# Patient Record
Sex: FEMALE | Race: WHITE | NOT HISPANIC OR LATINO | Employment: OTHER | URBAN - METROPOLITAN AREA
[De-identification: names, ages, dates, MRNs, and addresses within clinical notes are randomized per-mention and may not be internally consistent; named-entity substitution may affect disease eponyms.]

---

## 2017-03-02 ENCOUNTER — ALLSCRIPTS OFFICE VISIT (OUTPATIENT)
Dept: OTHER | Facility: OTHER | Age: 57
End: 2017-03-02

## 2017-03-03 ENCOUNTER — GENERIC CONVERSION - ENCOUNTER (OUTPATIENT)
Dept: OTHER | Facility: OTHER | Age: 57
End: 2017-03-03

## 2017-03-06 ENCOUNTER — LAB CONVERSION - ENCOUNTER (OUTPATIENT)
Dept: OTHER | Facility: OTHER | Age: 57
End: 2017-03-06

## 2017-03-06 LAB
CONTAINER TYP (HISTORICAL): NORMAL
FINAL RESOLUTION (HISTORICAL): NORMAL
QUESTION/PROBLEM (HISTORICAL): NORMAL
SPECIMEN STATUS REPORT (HISTORICAL): NORMAL

## 2017-03-09 ENCOUNTER — LAB CONVERSION - ENCOUNTER (OUTPATIENT)
Dept: OTHER | Facility: OTHER | Age: 57
End: 2017-03-09

## 2017-03-09 LAB
ADDITIONAL INFORMATION (HISTORICAL): NORMAL
COMMENT (HISTORICAL): NORMAL
CONTAINER TYP (HISTORICAL): NORMAL
DIAGNOSIS (HISTORICAL): NORMAL
FINAL RESOLUTION (HISTORICAL): NORMAL
MICRO DESCRIPTION (HISTORICAL): NORMAL
PATHOLOGIST (HISTORICAL): NORMAL
PROCEDURE (HISTORICAL): NORMAL
QUESTION/PROBLEM (HISTORICAL): NORMAL
SITE/SOURCE (HISTORICAL): NORMAL
SPECIMEN STATUS REPORT (HISTORICAL): NORMAL

## 2018-01-12 NOTE — PROGRESS NOTES
Assessment    1  Never a smoker   2  Hemorrhoids (455 6) (K64 9)   3  Encounter for preventive health examination (V70 0) (Z00 00)    Plan  Colonoscopy (Fiberoptic) Screening, Hemorrhoids    · 2 - *AMITA ( COLORECTAL SURGERY, GASTROENTEROLOGY) Physician  Referral  Consult  Status: Hold For - Scheduling   Requested for: [id number]  Care Summary provided  : Yes  Encounter for screening mammogram for malignant neoplasm of breast    · * MAMMO SCREENING BILATERAL W CAD; Status:Hold For - Scheduling; Requested  for:72Hsw2164;    · Digital Bilateral Screening Mammogram With CAD; Status:Canceled - Scheduling; Fatigue    · (Q) CBC (INCLUDES DIFF/PLT) (REFL); Status:Active; Requested for:61Pcy2145;    · (Q) COMPREHENSIVE METABOLIC PANEL W/O eGFR; Status:Active; Requested  for:09Vct8273;    · (Q) TSH, 3RD GENERATION; Status:Active; Requested for:94Nkp6433;   Fatigue, Vitamin D deficiency    · (1) VITAMIN D 25-HYDROXY; Status:Active; Requested for:95Dmv9156;   Screening for malignant neoplasm of colon    · COLONOSCOPY; Status:Active; Requested for:23Rko7985; Taking medication for chronic disease    · (1) URINALYSIS w URINE C/S REFLEX (will reflex a microscopy if leukocytes, occult  blood, or nitrites are not within normal limits); Status:Active; Requested for:68Rts1681;    · (Q) LIPID PANEL WITH REFLEX TO DIRECT LDL; Status:Active; Requested  for:13Wgg0511;     Discussion/Summary  health maintenance visit healthy adult female Currently, she eats a healthy diet and has an adequate exercise regimen  the risks and benefits of cervical cancer screening were discussed cervical cancer screening is current Breast cancer screening: the risks and benefits of breast cancer screening were discussed, self breast exam technique was taught, monthly self breast exam was advised and mammogram is current   Colorectal cancer screening: the risks and benefits of colorectal cancer screening were discussed and colonoscopy has been ordered  The risks and benefits of immunizations were discussed and immunizations are up to date  Advice and education were given regarding nutrition, aerobic exercise, weight bearing exercise, weight loss, self skin examination and seat belt use  Patient discussion: discussed with the patient  Physical - conducted, immunizations up to date, lab slip given patient stated "labs are not covered in my insurance and I plan to pay for a full screen even if it is $800"    mammo - due now  pap - under care Dr Lynda Carranza due 10/2016  colon - refer to Charleen    f/u 1 year or sooner if needed  Chief Complaint  Pt presents to the office for her general physical  colon due 10/2015, needs referral  mammo 07/08/2016, order printed  PAP deferred      History of Present Illness  HM, Adult Female: The patient is being seen for a health maintenance evaluation  General Health: The patient's health since the last visit is described as good  Lifestyle:  She consumes a diverse and healthy diet  She exercises regularly  She exercises 5 times per week  Exercise includes walking, biking and kayak  She does not use tobacco  She consumes alcohol  She denies drug use  Reproductive health: the patient is postmenopausal   hysterectomy 2004, oompherectomy 2005 follows with Dr Lynda Carrnaza for estrace cream, last appt Oct 2015  Screening:   HPI: Patient here for a physical  No complaints  Went to a dermatologist rosacea Dr Laron Carrel then refer to Dr Emilia Garrido  Also seeing Dr Laney Saravia for dry eyes and had plugs put in eyes for dry eyes  Review of Systems    Constitutional: No fever, no chills, feels well, no tiredness, no recent weight gain or weight loss  Eyes: No complaints of eye pain, no red eyes, no eyesight problems, no discharge, no dry eyes, no itching of eyes  ENT: no complaints of earache, no loss of hearing, no nose bleeds, no nasal discharge, no sore throat, no hoarseness     Cardiovascular: No complaints of slow heart rate, no fast heart rate, no chest pain, no palpitations, no leg claudication, no lower extremity edema  Respiratory: No complaints of shortness of breath, no wheezing, no cough, no SOB on exertion, no orthopnea, no PND  Gastrointestinal: No complaints of abdominal pain, no constipation, no nausea or vomiting, no diarrhea, no bloody stools  Genitourinary: No complaints of dysuria, no incontinence, no pelvic pain, no dysmenorrhea, no vaginal discharge or bleeding  Musculoskeletal: No complaints of arthralgias, no myalgias, no joint swelling or stiffness, no limb pain or swelling  Integumentary: No complaints of skin rash or lesions, no itching, no skin wounds, no breast pain or lump  Neurological: No complaints of headache, no confusion, no convulsions, no numbness, no dizziness or fainting, no tingling, no limb weakness, no difficulty walking  Psychiatric: Not suicidal, no sleep disturbance, no anxiety or depression, no change in personality, no emotional problems  Endocrine: No complaints of proptosis, no hot flashes, no muscle weakness, no deepening of the voice, no feelings of weakness  Hematologic/Lymphatic: No complaints of swollen glands, no swollen glands in the neck, does not bleed easily, does not bruise easily  Active Problems    1  Arthritis (716 90) (M19 90)   2  Bacterial vaginosis (616 10,041 9) (N76 0,B96 89)   3  Colonoscopy (Fiberoptic) Screening   4  Cyst of left kidney (753 10) (N28 1)   5  Dry eye syndrome (375 15) (H04 129)   6  Encounter for gynecological examination without abnormal finding (V72 31) (Z01 419)   7  Encounter for screening mammogram for malignant neoplasm of breast (V76 12)   (Z12 31)   8  Laboratory examination ordered as part of a routine general medical examination   (V72 62) (Z00 00)   9  Postmenopause atrophic vaginitis (627 3) (N95 2)   10  Shoulder joint pain, unspecified laterality   11   Squamous Cell Carcinoma Of The Skin (173 92)    Past Medical History    · History of Frozen shoulder (726 0) (M75 00)   · History of endometriosis (V13 29) (Z87 42)   · History of pityriasis rosea (V13 3) (Z87 2)   · History of Joint pain, knee (719 46) (M25 569)   · History of Pes anserine bursitis (726 61) (M70 50)   · History of Tick bite of right thigh (916 4,E906 4) (U11 541Z,E31  Lula Ballard)    Surgical History    · History of Arthroscopy Knee Right   · History of Bladder Surgery   · History of  Section   · History of Chemosurgery (Mohs Micrographic Technique)   · History of Hysterectomy   · History of Oophorectomy    Family History  Mother    · Family history of cardiac disorder (V17 49) (Z82 49)   · Family history of Parkinson disease, symptomatic  Father    · Family history of cardiac disorder (V17 49) (Z80 55)  Sister    · Family history of cerebral aneurysm (V17 1) (Z82 49)  Brother    · Family history of Arthritis (V17 7)   · Family history of Heart Disease (V17 49)   · Family history of Hypertension (V17 49)   · Family history of Ischemic Stroke (V17 1)   · Family history of Kidney Cancer (V16 51)   · Family history of Osteoporosis (V17 81)  Grandfather    · Family history of cardiac disorder (V17 49) (Z82 49)    Social History    · Always uses seat belt   · Being A Social Drinker   · Caffeine Use   · Denied: History of Drug Use   · Has smoke detectors   · Marital History - Currently    · Never a smoker   · Uses Safety Equipment   · Uses Safety Equipment - Seatbelts    Current Meds   1  Betamethasone Sod Phos & Acet 6 (3-3) MG/ML Injection Suspension; USE AS   DIRECTED; To Be Done: 49ZAN5253; Status: HOLD FOR - Administration Ordered   2  Betamethasone Sod Phos & Acet 6 (3-3) MG/ML Injection Suspension; USE AS   DIRECTED; To Be Done: 29GTU7790; Status: HOLD FOR - Administration Ordered   3  Estrace 0 1 MG/GM Vaginal Cream; USE TWICE WEEKLY AS DIRECTED  Requested   for: 88OJR1382; Last Rx:2015 Ordered   4  H[place] TABS;    Therapy: (Recorded:12May2015) to Recorded   5  Krill Oil CAPS; take 1 capsule daily; Therapy: (Recorded:28Mar2014) to Recorded   6  Lidocaine HCl - 1 % Injection Solution; USE AS DIRECTED; To Be Done: 58CMT9240;   Status: HOLD FOR - Administration Ordered   7  Restasis 0 05 % Ophthalmic Emulsion; USE AS DIRECTED; Therapy: (Recorded:12May2015) to Recorded   8  Solodyn 80 MG Oral Tablet Extended Release 24 Hour; Take 1 tablet daily; Therapy: (Recorded:26Jul2016) to Recorded   9  Vitamin B Complex CAPS; TAKE 1 CAPSULE DAILY; Therapy: (Recorded:12May2015) to Recorded    Allergies    1  No Known Drug Allergies    2  Seasonal    Vitals   Recorded: 76FKS8189 95:49MB   Systolic 950, RUE, Sitting   Diastolic 64, RUE, Sitting   Heart Rate 68, R Radial   Pulse Quality Normal, R Radial   Respiration Quality Normal   Respiration 16   Height 5 ft 5 75 in   Weight 186 lb 6 4 oz   BMI Calculated 30 32   BSA Calculated 1 94     Physical Exam    Constitutional   General appearance: No acute distress, well appearing and well nourished  Head and Face   Head and face: Normal     Eyes   Conjunctiva and lids: No swelling, erythema or discharge  Pupils and irises: Equal, round, reactive to light  Ears, Nose, Mouth, and Throat   External inspection of ears and nose: Normal     Otoscopic examination: Tympanic membranes translucent with normal light reflex  Canals patent without erythema  Hearing: Normal     Nasal mucosa, septum, and turbinates: Normal without edema or erythema  Lips, teeth, and gums: Normal, good dentition  Oropharynx: Normal with no erythema, edema, exudate or lesions  Neck   Neck: Supple, symmetric, trachea midline, no masses  Thyroid: Normal, no thyromegaly  Pulmonary   Respiratory effort: No increased work of breathing or signs of respiratory distress  Palpation of chest: Normal     Auscultation of lungs: Clear to auscultation  Cardiovascular   Palpation of heart: Normal PMI, no thrills  Auscultation of heart: Normal rate and rhythm, normal S1 and S2, no murmurs  Carotid pulses: 2+ bilaterally  Pedal pulses: 2+ bilaterally  Examination of extremities for edema and/or varicosities: Normal     Abdomen   Abdomen: Non-tender, no masses  Liver and spleen: No hepatomegaly or splenomegaly  Lymphatic   Palpation of lymph nodes in neck: No lymphadenopathy  Musculoskeletal   Gait and station: Normal     Digits and nails: Normal without clubbing or cyanosis  Joints, bones, and muscles: Normal     Range of motion: Normal     Stability: Normal     Muscle strength/tone: Normal     Skin   Skin and subcutaneous tissue: Normal without rashes or lesions  Palpation of skin and subcutaneous tissue: Normal turgor  Neurologic   Cranial nerves: Cranial nerves II-XII intact  Reflexes: 2+ and symmetric  Psychiatric   Judgment and insight: Normal     Mood and affect: Normal        Results/Data  PHQ-2 Adult Depression Screening 59Vpz4757 02:31PM User, [facility]     Test Name Result Flag Reference   PHQ-2 Adult Depression Score 0     Over the last two weeks, how often have you been bothered by any of the following problems? Little interest or pleasure in doing things: Not at all - 0  Feeling down, depressed, or hopeless: Not at all - 0   PHQ-2 Adult Depression Screening Negative         Health Management  Colonoscopy (Fiberoptic) Screening   COLONOSCOPY; every 10 years; Last 12Oct2005; Next Due: 35NNW7986; Overdue  Encounter for gynecological examination without abnormal finding   (every) THINPREP TIS PAP RFX HPV; every 1 year; Last 10Apr2013; Next Due:  66Xkh7941; Overdue  Encounter for screening mammogram for malignant neoplasm of breast   Digital Bilateral Screening Mammogram With CAD; every 1 year; Last 09WNU0804; Next  Due: 79Qxb4707;  Overdue    Signatures   Electronically signed by : Alfornia Dancer, [facility]; Jul 26 2016  3:37PM EST                       (Author)    Electronically signed by : PORTILLO Maya ; Jul 26 2016  5:35PM EST                       (Author)

## 2018-01-14 VITALS
HEIGHT: 66 IN | SYSTOLIC BLOOD PRESSURE: 118 MMHG | DIASTOLIC BLOOD PRESSURE: 72 MMHG | BODY MASS INDEX: 30.41 KG/M2 | WEIGHT: 189.19 LBS

## 2018-02-07 ENCOUNTER — OFFICE VISIT (OUTPATIENT)
Dept: FAMILY MEDICINE CLINIC | Facility: CLINIC | Age: 58
End: 2018-02-07
Payer: COMMERCIAL

## 2018-02-07 VITALS
SYSTOLIC BLOOD PRESSURE: 126 MMHG | BODY MASS INDEX: 30.7 KG/M2 | RESPIRATION RATE: 14 BRPM | WEIGHT: 191 LBS | DIASTOLIC BLOOD PRESSURE: 74 MMHG | HEART RATE: 64 BPM | HEIGHT: 66 IN

## 2018-02-07 DIAGNOSIS — Z00.00 HEALTH MAINTENANCE EXAMINATION: Primary | ICD-10-CM

## 2018-02-07 DIAGNOSIS — C44.92 SQUAMOUS CELL CARCINOMA OF SKIN: ICD-10-CM

## 2018-02-07 DIAGNOSIS — N28.1 BENIGN CYST OF LEFT KIDNEY: ICD-10-CM

## 2018-02-07 DIAGNOSIS — N28.1 CYST OF LEFT KIDNEY: ICD-10-CM

## 2018-02-07 DIAGNOSIS — Z12.39 ENCOUNTER FOR SCREENING FOR MALIGNANT NEOPLASM OF BREAST: ICD-10-CM

## 2018-02-07 DIAGNOSIS — Z13.1 SCREENING FOR DIABETES MELLITUS: ICD-10-CM

## 2018-02-07 DIAGNOSIS — R53.83 OTHER FATIGUE: ICD-10-CM

## 2018-02-07 DIAGNOSIS — N95.2 POSTMENOPAUSE ATROPHIC VAGINITIS: ICD-10-CM

## 2018-02-07 DIAGNOSIS — Z13.6 SCREENING FOR CARDIOVASCULAR CONDITION: ICD-10-CM

## 2018-02-07 DIAGNOSIS — E55.9 VITAMIN D DEFICIENCY: ICD-10-CM

## 2018-02-07 PROCEDURE — 99396 PREV VISIT EST AGE 40-64: CPT | Performed by: PHYSICIAN ASSISTANT

## 2018-02-07 RX ORDER — HYDROCORTISONE ACETATE 0.5 %
CREAM (GRAM) TOPICAL
COMMUNITY
End: 2020-06-30 | Stop reason: ALTCHOICE

## 2018-02-07 RX ORDER — ESTRADIOL 0.1 MG/G
CREAM VAGINAL
COMMUNITY
End: 2018-07-24 | Stop reason: SDUPTHER

## 2018-02-07 RX ORDER — BIOTIN 5 MG
1 TABLET ORAL DAILY
COMMUNITY
End: 2020-06-30 | Stop reason: ALTCHOICE

## 2018-02-07 RX ORDER — B-COMPLEX WITH VITAMIN C
1 TABLET ORAL DAILY
COMMUNITY
End: 2020-06-30 | Stop reason: ALTCHOICE

## 2018-02-07 NOTE — PROGRESS NOTES
Assessment/Plan:    1  Health maintenance examination    - labs ordered  -healthy lifestyle with diet and exercise encouraged  - Tdap up to date    2  Benign cyst of left kidney    - US retroperitoneal complete    3  Vitamin D deficiency    - Vitamin D 25 hydroxy ordered      4  Postmenopause atrophic vaginitis    - c/w estrace cream as needed, Dr Carmine Gonzalez yearly    5  Squamous cell carcinoma of skin    - c/w [facility] derm yearly    Mammo - ordered today  Colon done 3/2017 must obtain records, due 2022  PAP - defer due to hysterectomy, follows for yearly exams    F/u 1 year or sooner if needed            Subjective:   Chief Complaint   Patient presents with    Annual Exam      Patient ID: Leesa Garcia is a 59 y o  female  Patient here for routine follow up  Had colonoscopy in 3/2017, found polyps due for repeat in 5 years  Had vuvla biopsy in 3/2017 was a hematoma  Benign  No longer has paps, just yearly exams with Dr Carmine Gonzalez and refills of Estrace  Also requesting u/s left kidney to follow up on her 5 3 cm cyst also due to her strong family history of kidney cancer  Feeling well has no complaints at this time           The following portions of the patient's history were reviewed and updated as appropriate: allergies, current medications, past family history, past medical history, past social history, past surgical history and problem list     Past Medical History:   Diagnosis Date    Endometriosis     Frozen shoulder     Pes anserine bursitis     Pityriasis rosea      Past Surgical History:   Procedure Laterality Date    BLADDER SURGERY      endometrial madd removed from bladder during [address] ARTHROSCOPY      medical meniscus tear and arthritis scraping     MOHS SURGERY      chemosurgery    OOPHORECTOMY Bilateral      Family History   Problem Relation Age of Onset    Heart defect Mother     Parkinsonism Mother     Heart disease Mother cardiac disorder    Heart defect Father     Heart disease Father      cardiac disorder    Aneurysm Sister     Cerebral aneurysm Sister     Arthritis Brother     Heart defect Brother     Hypertension Brother     Stroke Brother      ischemic    Kidney cancer Brother     Osteoporosis Brother     Heart disease Brother     No Known Problems Maternal Grandmother     No Known Problems Maternal Grandfather     No Known Problems Paternal Grandmother     No Known Problems Paternal Grandfather     Heart disease Other      cardiac disorder     Social History     Social History    Marital status: /Civil Union     Spouse name: N/A    Number of children: N/A    Years of education: N/A     Occupational History    Not on file  Social History Main Topics    Smoking status: Never Smoker    Smokeless tobacco: Never Used    Alcohol use Yes      Comment: social    Drug use: No    Sexual activity: Not on file     Other Topics Concern    Not on file     Social History Narrative    Always uses a seatbelt    Caffeine use    Has smoke detectors        Review of Systems   Constitutional: Negative  HENT: Negative  Eyes: Negative  Respiratory: Negative  Cardiovascular: Negative  Gastrointestinal: Negative  Genitourinary: Negative  Musculoskeletal: Negative  Skin: Negative  Neurological: Negative  Hematological: Negative  Psychiatric/Behavioral: Negative  Objective:    Vitals:    02/07/18 0957   BP: 126/74   BP Location: Left arm   Patient Position: Sitting   Cuff Size: Adult   Pulse: 64   Resp: 14   Weight: 86 6 kg (191 lb)   Height: 5' 5 75" (1 67 m)        Physical Exam   Constitutional: She is oriented to person, place, and time  She appears well-developed and well-nourished  HENT:   Head: Normocephalic and atraumatic     Right Ear: External ear normal    Left Ear: External ear normal    Nose: Nose normal    Mouth/Throat: Oropharynx is clear and moist    Eyes: Conjunctivae and EOM are normal  Pupils are equal, round, and reactive to light  Neck: Normal range of motion  Neck supple  No thyromegaly present  Cardiovascular: Normal rate, regular rhythm, normal heart sounds and intact distal pulses  Pulmonary/Chest: Effort normal and breath sounds normal  She has no wheezes  She has no rales  Abdominal: Soft  Bowel sounds are normal  She exhibits no distension and no mass  There is no tenderness  Musculoskeletal: Normal range of motion  She exhibits no edema  Lymphadenopathy:     She has no cervical adenopathy  Neurological: She is alert and oriented to person, place, and time  She has normal reflexes  Skin: Skin is warm and dry  Psychiatric: She has a normal mood and affect  Vitals reviewed

## 2018-02-07 NOTE — PROGRESS NOTES
I supervised the Advanced Practitioner  I discussed the case with Maximino De Anda PA-C, and reviewed the AP note  I agree with the AP note

## 2018-02-10 LAB
25(OH)D3 SERPL-MCNC: 37 NG/ML (ref 30–100)
ALBUMIN SERPL-MCNC: 4.5 G/DL (ref 3.6–5.1)
ALBUMIN/GLOB SERPL: 1.7 (CALC) (ref 1–2.5)
ALP SERPL-CCNC: 46 U/L (ref 33–130)
ALT SERPL-CCNC: 20 U/L (ref 6–29)
AST SERPL-CCNC: 25 U/L (ref 10–35)
BILIRUB SERPL-MCNC: 0.7 MG/DL (ref 0.2–1.2)
BUN SERPL-MCNC: 17 MG/DL (ref 7–25)
BUN/CREAT SERPL: NORMAL (CALC) (ref 6–22)
CALCIUM SERPL-MCNC: 9.5 MG/DL (ref 8.6–10.4)
CHLORIDE SERPL-SCNC: 105 MMOL/L (ref 98–110)
CHOLEST SERPL-MCNC: 229 MG/DL
CHOLEST/HDLC SERPL: 3.4 (CALC)
CO2 SERPL-SCNC: 28 MMOL/L (ref 20–31)
CREAT SERPL-MCNC: 0.89 MG/DL (ref 0.5–1.05)
GLOBULIN SER CALC-MCNC: 2.6 G/DL (CALC) (ref 1.9–3.7)
GLUCOSE SERPL-MCNC: 84 MG/DL (ref 65–99)
HDLC SERPL-MCNC: 68 MG/DL
LDLC SERPL CALC-MCNC: 141 MG/DL (CALC)
NONHDLC SERPL-MCNC: 161 MG/DL (CALC)
POTASSIUM SERPL-SCNC: 4.2 MMOL/L (ref 3.5–5.3)
PROT SERPL-MCNC: 7.1 G/DL (ref 6.1–8.1)
SL AMB EGFR AFRICAN AMERICAN: 82 ML/MIN/1.73M2
SL AMB EGFR NON AFRICAN AMERICAN: 71 ML/MIN/1.73M2
SODIUM SERPL-SCNC: 142 MMOL/L (ref 135–146)
TRIGL SERPL-MCNC: 90 MG/DL
TSH SERPL-ACNC: 3.01 MIU/L (ref 0.4–4.5)

## 2018-02-13 ENCOUNTER — TELEPHONE (OUTPATIENT)
Dept: FAMILY MEDICINE CLINIC | Facility: CLINIC | Age: 58
End: 2018-02-13

## 2018-02-13 NOTE — TELEPHONE ENCOUNTER
She should be able to see them in my chart now  I have to review them before they will populate  They all looked good/normal EXCEPT her bad cholesterol went from 107 in 2016 to 141 this year, goal is less than 130  She should watch her diet such as eggs, fried foods, red meat  We can recheck it next year

## 2018-02-13 NOTE — TELEPHONE ENCOUNTER
Patient had labs done last week and is looking for her result   She tried going on My Chart, however did not have success      [phone]

## 2018-02-21 ENCOUNTER — TELEPHONE (OUTPATIENT)
Dept: UROLOGY | Facility: AMBULATORY SURGERY CENTER | Age: 58
End: 2018-02-21

## 2018-02-21 ENCOUNTER — HOSPITAL ENCOUNTER (OUTPATIENT)
Dept: BONE DENSITY | Facility: IMAGING CENTER | Age: 58
Discharge: HOME | End: 2018-02-21
Payer: COMMERCIAL

## 2018-02-21 ENCOUNTER — HOSPITAL ENCOUNTER (OUTPATIENT)
Dept: ULTRASOUND IMAGING | Facility: HOSPITAL | Age: 58
Discharge: HOME | End: 2018-02-21
Payer: COMMERCIAL

## 2018-02-21 PROCEDURE — 77067 SCR MAMMO BI INCL CAD: CPT

## 2018-02-21 PROCEDURE — 76770 US EXAM ABDO BACK WALL COMP: CPT

## 2018-02-21 NOTE — TELEPHONE ENCOUNTER
Patient has strong history of renal cancer in her family and she has a growing cyst on kidney  She would like a 2nd opinion per Karen (PA)  She can come to either [facility] or [facility] and would like Monday, Wednesday or Friday

## 2018-02-22 NOTE — TELEPHONE ENCOUNTER
Patient is scheduled  For 3/13/2018 in the [facility] office  Please   send new patient packet

## 2018-03-13 ENCOUNTER — OFFICE VISIT (OUTPATIENT)
Dept: UROLOGY | Facility: HOSPITAL | Age: 58
End: 2018-03-13
Payer: COMMERCIAL

## 2018-03-13 VITALS
SYSTOLIC BLOOD PRESSURE: 142 MMHG | DIASTOLIC BLOOD PRESSURE: 80 MMHG | HEIGHT: 66 IN | WEIGHT: 196 LBS | HEART RATE: 60 BPM | BODY MASS INDEX: 31.5 KG/M2

## 2018-03-13 DIAGNOSIS — N28.1 KIDNEY CYSTS: Primary | ICD-10-CM

## 2018-03-13 LAB
SL AMB  POCT GLUCOSE, UA: NORMAL
SL AMB LEUKOCYTE ESTERASE,UA: NORMAL
SL AMB POCT BILIRUBIN,UA: NORMAL
SL AMB POCT BLOOD,UA: NORMAL
SL AMB POCT CLARITY,UA: CLEAR
SL AMB POCT COLOR,UA: YELLOW
SL AMB POCT KETONES,UA: NORMAL
SL AMB POCT NITRITE,UA: NORMAL
SL AMB POCT PH,UA: 5
SL AMB POCT SPECIFIC GRAVITY,UA: 1.03
SL AMB POCT URINE PROTEIN: NORMAL
SL AMB POCT UROBILINOGEN: NORMAL

## 2018-03-13 PROCEDURE — 81002 URINALYSIS NONAUTO W/O SCOPE: CPT | Performed by: UROLOGY

## 2018-03-13 PROCEDURE — 99244 OFF/OP CNSLTJ NEW/EST MOD 40: CPT | Performed by: UROLOGY

## 2018-03-13 NOTE — PROGRESS NOTES
Assessment/Plan:    Cyst of left kidney  I reviewed the patient's last 2 renal ultrasounds with her  Her cyst is simple in nature  We discussed there is no real risk of malignancy with simple cysts and they do not need to be followed  She does have a strong family history of kidney cancer and it is not unreasonable to get surveillance renal ultrasounds every few years with her primary doctor  She will follow up with us on an as-needed basis  Diagnoses and all orders for this visit:    Kidney cysts  -     POCT urine dip          Total visit time was 60 minutes of which over 50% was spent on counseling  Subjective:     Patient ID: Saida Conroy is a 59 y o  female    57-year-old female presents for evaluation of renal cyst   The cyst was found after CT scan performed after surgery for endometriosis  The patient did have severe endometriosis that she states was growing into her bladder and ureters and required major surgery in  with bladder reconstruction  She denies any flank pain or gross hematuria  She does have a significant family history of renal cell carcinoma with a grandmother who  from the disease and a brother who had a nephrectomy  She has no other urinary complaints  The following portions of the patient's history were reviewed and updated as appropriate: allergies, current medications, past family history, past medical history, past social history, past surgical history and problem list     Review of Systems   Constitutional: Negative  HENT: Negative  Eyes: Negative  Respiratory: Negative  Cardiovascular: Negative  Gastrointestinal: Negative  Endocrine: Negative  Genitourinary:        As noted per HPI   Musculoskeletal: Negative  Skin: Negative  Allergic/Immunologic: Negative  Neurological: Negative  Hematological: Negative  Psychiatric/Behavioral: Negative              Urinary Incontinence Screening    Flowsheet Row Most Recent Value   Urinary Incontinence   Urinary Incontinence? No   Incomplete emptying? No   Urinary frequency? No   Urinary urgency? No   Urinary hesitancy? No   Dysuria (painful difficult urination)? No   Nocturia (waking up to use the bathroom)? No   Straining (having to push to go)? No   Weak stream?  No   Intermittent stream?  No   Vaginal pressure? No   Vaginal dryness? No          Objective:    Physical Exam   Constitutional: She is oriented to person, place, and time  She appears well-developed and well-nourished  HENT:   Head: Normocephalic and atraumatic  Neck: Normal range of motion  Neck supple  Cardiovascular: Intact distal pulses  Pulmonary/Chest: Effort normal    Abdominal: Soft  Bowel sounds are normal  She exhibits no distension and no mass  There is no tenderness  There is no rebound and no guarding  Musculoskeletal: Normal range of motion  Neurological: She is alert and oriented to person, place, and time  Skin: Skin is warm and dry  Psychiatric: She has a normal mood and affect  Vitals reviewed          Results  No results found for: PSA  Lab Results   Component Value Date    CALCIUM 9 5 02/09/2018     07/27/2016    K 3 9 07/27/2016    CO2 26 07/27/2016     07/27/2016    BUN 17 02/09/2018    CREATININE 0 89 02/09/2018     Lab Results   Component Value Date    WBC 4 7 07/27/2016    WBC NONE SEEN 07/27/2016    HGB 13 4 07/27/2016    HCT 40 0 07/27/2016    MCV 92 5 07/27/2016     07/27/2016       Recent Results (from the past 1 hour(s))   POCT urine dip    Collection Time: 03/13/18  8:47 AM   Result Value Ref Range    LEUKOCYTE ESTERASE,UA -      NITRITE,UA -     SL AMB POCT UROBILINOGEN -     SL AMB POCT URINE PROTEIN -      PH,UA 5 0      BLOOD,UA -      SPECIFIC GRAVITY,UA 1 030      KETONES,UA -      BILIRUBIN,UA -     GLUCOSE, UA -      COLOR,UA yellow      CLARITY,UA clear    ]

## 2018-03-13 NOTE — ASSESSMENT & PLAN NOTE
I reviewed the patient's last 2 renal ultrasounds with her  Her cyst is simple in nature  We discussed there is no real risk of malignancy with simple cysts and they do not need to be followed  She does have a strong family history of kidney cancer and it is not unreasonable to get surveillance renal ultrasounds every few years with her primary doctor  She will follow up with us on an as-needed basis

## 2018-03-13 NOTE — PATIENT INSTRUCTIONS
Kidney Cyst   AMBULATORY CARE:   A kidney cyst  is a fluid-filled sac that grows in your kidney  A simple cyst usually does not contain cancer  A complex cyst contains calcium deposits and needs to be checked over time for cancer  You may have one or more cysts  Both kidneys may form cysts at the same time  Common signs and symptoms include the following:  Kidney cysts usually do not cause symptoms  A cyst that grows large may cause pain or discomfort in your side or abdomen  You may have blood in your urine or dark urine, or develop high blood pressure  Tenderness along with pain and a fever may be a sign of an infected cyst   Seek care immediately if:   · You have blood in your urine or your urine is dark  · You have trouble urinating, or you are urinating more often than usual     · You have a fever along with pain or tenderness below your ribcage and above your hip bones  Contact your healthcare provider if:   · You have questions or concerns about your condition or care  Treatment:  Your cyst may need no treatment  Your healthcare provider may want you to have tests to check the size of the cyst over time  You may also need tests if you have hard deposits in the cyst  The deposits will be checked for cancer or other material that can cause health problems  Fluid may need to be drained from a cyst that becomes infected, bursts, or blocks blood or urine flow in the kidney  Surgery may be needed if the cyst is large  Manage kidney cysts:   · Drink liquids as directed  Liquids help your kidneys work correctly  They can also help prevent a urinary tract infection  Ask your healthcare provider how much liquid to have each day and which liquids are best for you  Ask if you need to limit or not drink alcohol  Alcohol may damage your kidneys  · Manage health conditions  Over time, conditions such as diabetes and high blood pressure that are not controlled may damage your kidneys  · Do not smoke  Smoking may narrow blood vessels in your kidneys and raise your blood pressure  Smoking can also damage your kidneys  Ask your healthcare provider for information if you currently smoke and need help quitting  E-cigarettes or smokeless tobacco still contain nicotine  Talk to your healthcare provider before you use these products  Follow up with your healthcare provider as directed: You may need to have ultrasounds to check the size, shape, and contents of the cyst over time  Write down your questions so you remember to ask them during your visits  © 2017 [employer]  Information is for End User's use only and may not be sold, redistributed or otherwise used for commercial purposes  All illustrations and images included in CareNotes® are the copyrighted property of [employer]  or [employer]  The above information is an  only  It is not intended as medical advice for individual conditions or treatments  Talk to your doctor, nurse or pharmacist before following any medical regimen to see if it is safe and effective for you

## 2018-03-13 NOTE — LETTER
2018     Stephanie Ivan MD  [address]    Patient: Maninder Graves   YOB: 1960   Date of Visit: 3/13/2018       Dear Dr Aguilar Overall:    Thank you for referring Gallo Sanchez to me for evaluation  Below are my notes for this consultation  If you have questions, please do not hesitate to call me  I look forward to following your patient along with you  Sincerely,        Issa Restrepo MD        CC: No Recipients  Issa Restrepo MD  3/13/2018  9:09 AM  Sign at close encounter  Assessment/Plan:    Cyst of left kidney  I reviewed the patient's last 2 renal ultrasounds with her  Her cyst is simple in nature  We discussed there is no real risk of malignancy with simple cysts and they do not need to be followed  She does have a strong family history of kidney cancer and it is not unreasonable to get surveillance renal ultrasounds every few years with her primary doctor  She will follow up with us on an as-needed basis  Diagnoses and all orders for this visit:    Kidney cysts  -     POCT urine dip          Total visit time was 60 minutes of which over 50% was spent on counseling  Subjective:     Patient ID: Maninder Graves is a 59 y o  female    64-year-old female presents for evaluation of renal cyst   The cyst was found after CT scan performed after surgery for endometriosis  The patient did have severe endometriosis that she states was growing into her bladder and ureters and required major surgery in  with bladder reconstruction  She denies any flank pain or gross hematuria  She does have a significant family history of renal cell carcinoma with a grandmother who  from the disease and a brother who had a nephrectomy  She has no other urinary complaints            The following portions of the patient's history were reviewed and updated as appropriate: allergies, current medications, past family history, past medical history, past social history, past surgical history and problem list     Review of Systems   Constitutional: Negative  HENT: Negative  Eyes: Negative  Respiratory: Negative  Cardiovascular: Negative  Gastrointestinal: Negative  Endocrine: Negative  Genitourinary:        As noted per HPI   Musculoskeletal: Negative  Skin: Negative  Allergic/Immunologic: Negative  Neurological: Negative  Hematological: Negative  Psychiatric/Behavioral: Negative  Urinary Incontinence Screening    Flowsheet Row Most Recent Value   Urinary Incontinence   Urinary Incontinence? No   Incomplete emptying? No   Urinary frequency? No   Urinary urgency? No   Urinary hesitancy? No   Dysuria (painful difficult urination)? No   Nocturia (waking up to use the bathroom)? No   Straining (having to push to go)? No   Weak stream?  No   Intermittent stream?  No   Vaginal pressure? No   Vaginal dryness? No          Objective:    Physical Exam   Constitutional: She is oriented to person, place, and time  She appears well-developed and well-nourished  HENT:   Head: Normocephalic and atraumatic  Neck: Normal range of motion  Neck supple  Cardiovascular: Intact distal pulses  Pulmonary/Chest: Effort normal    Abdominal: Soft  Bowel sounds are normal  She exhibits no distension and no mass  There is no tenderness  There is no rebound and no guarding  Musculoskeletal: Normal range of motion  Neurological: She is alert and oriented to person, place, and time  Skin: Skin is warm and dry  Psychiatric: She has a normal mood and affect  Vitals reviewed          Results  No results found for: PSA  Lab Results   Component Value Date    CALCIUM 9 5 02/09/2018     07/27/2016    K 3 9 07/27/2016    CO2 26 07/27/2016     07/27/2016    BUN 17 02/09/2018    CREATININE 0 89 02/09/2018     Lab Results   Component Value Date    WBC 4 7 07/27/2016    WBC NONE SEEN 07/27/2016    HGB 13 4 07/27/2016    HCT 40 0 07/27/2016    MCV 92 5 07/27/2016     07/27/2016       Recent Results (from the past 1 hour(s))   POCT urine dip    Collection Time: 03/13/18  8:47 AM   Result Value Ref Range    LEUKOCYTE ESTERASE,UA -      NITRITE,UA -     SL AMB POCT UROBILINOGEN -     SL AMB POCT URINE PROTEIN -      PH,UA 5 0      BLOOD,UA -      SPECIFIC GRAVITY,UA 1 030      KETONES,UA -      BILIRUBIN,UA -     GLUCOSE, UA -      COLOR,UA yellow      CLARITY,UA clear    ]

## 2018-07-24 ENCOUNTER — ANNUAL EXAM (OUTPATIENT)
Dept: OBGYN CLINIC | Facility: MEDICAL CENTER | Age: 58
End: 2018-07-24
Payer: COMMERCIAL

## 2018-07-24 VITALS
DIASTOLIC BLOOD PRESSURE: 70 MMHG | BODY MASS INDEX: 30.84 KG/M2 | SYSTOLIC BLOOD PRESSURE: 122 MMHG | HEIGHT: 65 IN | WEIGHT: 185.1 LBS

## 2018-07-24 DIAGNOSIS — Z12.31 ENCOUNTER FOR SCREENING MAMMOGRAM FOR MALIGNANT NEOPLASM OF BREAST: ICD-10-CM

## 2018-07-24 DIAGNOSIS — N95.1 MENOPAUSAL SYMPTOMS: ICD-10-CM

## 2018-07-24 DIAGNOSIS — Z01.419 ENCOUNTER FOR GYNECOLOGICAL EXAMINATION (GENERAL) (ROUTINE) WITHOUT ABNORMAL FINDINGS: Primary | ICD-10-CM

## 2018-07-24 PROCEDURE — 99396 PREV VISIT EST AGE 40-64: CPT | Performed by: OBSTETRICS & GYNECOLOGY

## 2018-07-24 RX ORDER — ESTRADIOL 0.1 MG/G
1 CREAM VAGINAL 2 TIMES WEEKLY
Qty: 127.5 G | Refills: 3 | Status: SHIPPED | OUTPATIENT
Start: 2018-07-26 | End: 2019-08-13 | Stop reason: SDUPTHER

## 2018-07-24 NOTE — PROGRESS NOTES
ASSESSMENT & PLAN: Harris Pikcens was seen today for gynecologic exam     Diagnoses and all orders for this visit:    Encounter for gynecological examination (general) (routine) without abnormal findings    Encounter for screening mammogram for malignant neoplasm of breast  -     Mammo screening bilateral w 3d & cad; Future    Menopausal symptoms  -     estradiol (ESTRACE VAGINAL) 0 1 mg/g vaginal cream; Insert 1 g into the vagina 2 (two) times a week      1  Routine well woman exam done today  2  Pap and HPV:  The patient's pap is up to date  Pap and cotesting was not done today  Current ASCCP Guidelines reviewed  3   Mammogram was ordered  4  Colonoscopy is up to date  4  The following were reviewed in today's visit: adequate intake of calcium and vitamin D, exercise and healthy diet  5  F/u 1 year  CC:  Annual Gynecologic Examination    HPI: Jose Daniel Elizondo is a 59 y o  who presents for annual gynecologic examination  She has the following concerns:  No concerns  Doing well on their Estrogen vaginal cream     Health Maintenance:    Patient describes her health as excellent  Patient has weight concerns  She exercises 3-4 days per week with walking and swimming (just got a pool)  She doeswears her seatbelt routinely  She does not perform regular monthly self breast exams  She does feel safe at home  Patients does follow a healthy diet  Patient gets 2 servings of dairy or calcium rich foods a day      Last pap: n/a  Last mammogram: 2018  Last colonoscopy: 2017    Patient Active Problem List   Diagnosis    Arthritis    Cyst of left kidney    Dry eye syndrome    Hemorrhoids    Postmenopause atrophic vaginitis    Squamous cell carcinoma of skin    Vitamin D deficiency    Health maintenance examination    Benign cyst of left kidney       Past Medical History:   Diagnosis Date    Endometriosis     Frozen shoulder     Pes anserine bursitis     Pityriasis rosea        Past Surgical History:   Procedure Laterality Date    BLADDER SURGERY      endometrial madd removed from bladder during BSO     SECTION      HYSTERECTOMY      KNEE ARTHROSCOPY      medical meniscus tear and arthritis scraping     MOHS SURGERY      chemosurgery    OOPHORECTOMY Bilateral        Past OB/Gyn History:    History of abnormal pap smears: No    Patient is currently sexually active  heterosexual  Patient's family planning method is status post hysterectomy  Family History   Problem Relation Age of Onset    Heart defect Mother     Parkinsonism Mother     Heart disease Mother         cardiac disorder    Heart defect Father     Heart disease Father         cardiac disorder    Aneurysm Sister     Cerebral aneurysm Sister     Arthritis Brother     Heart defect Brother     Hypertension Brother     Stroke Brother         ischemic    Kidney cancer Brother     Osteoporosis Brother     Heart disease Brother     No Known Problems Maternal Grandmother     No Known Problems Maternal Grandfather     No Known Problems Paternal Grandmother     No Known Problems Paternal Grandfather     Heart disease Other         cardiac disorder       Social History:  Social History     Social History    Marital status: /Civil Union     Spouse name: N/A    Number of children: N/A    Years of education: N/A     Occupational History    Not on file  Social History Main Topics    Smoking status: Never Smoker    Smokeless tobacco: Never Used    Alcohol use Yes      Comment: social    Drug use: No    Sexual activity: Yes     Partners: Male     Birth control/ protection: Female Sterilization     Other Topics Concern    Not on file     Social History Narrative    Always uses a seatbelt    Caffeine use    Has smoke detectors      Presently lives with spouse and daughter  Patient is currently retired      Allergies   Allergen Reactions    Other Allergic Rhinitis         Current Outpatient Prescriptions:   [START ON 7/26/2018] estradiol (ESTRACE VAGINAL) 0 1 mg/g vaginal cream, Insert 1 g into the vagina 2 (two) times a week, Disp: 127 5 g, Rfl: 3    H[place] 300 MG CAPS, Take by mouth, Disp: , Rfl:     Krill Oil 1000 MG CAPS, Take 1 capsule by mouth daily, Disp: , Rfl:     VITAMIN B COMPLEX-C CAPS, Take 1 capsule by mouth daily, Disp: , Rfl:     Review of Systems  Constitutional :no fever, feels well, no tiredness, no recent weight gain or loss  ENT: no ear ache, no loss of hearing, no nosebleeds or nasal discharge, no sore throat or hoarseness  Cardiovascular: no complaints of slow or fast heart beat, no chest pain, no palpitations, no leg claudication or lower extremity edema  Respiratory: no complaints of shortness of shortness of breath, no MEZA  Breasts:no complaints of breast pain, breast lump, or nipple discharge  Gastrointestinal: no complaints of abdominal pain, constipation, nausea, vomiting, or diarrhea or bloody stools  Genitourinary : no complaints of dysuria, incontinence, pelvic pain, no dysmenorrhea, vaginal discharge or abnormal vaginal bleeding and as noted in HPI  Musculoskeletal: no complaints of arthralgia, no myalgia, no joint swelling or stiffness, no limb pain or swelling  Integumentary: no complaints of skin rash or lesion, itching or dry skin  Neurological: no complaints of headache, no confusion, no numbness or tingling, no dizziness or fainting    Physical Exam:   /70   Ht 5' 5 2" (1 656 m)   Wt 84 kg (185 lb 1 6 oz)   BMI 30 61 kg/m²     General:   appears stated age, cooperative, alert normal mood and affect   Psychiatric oriented to person, place and time  Mood and affect normal   Neck: normal, supple,trachea midline, no masses    Thyroid: normal, no thyromegaly   Heart: regular rate and rhythm, S1, S2 normal, no murmur, click, rub or gallop   Lungs: clear to auscultation bilaterally, no increased work of breathing or signs of respiratory distress   Breasts: normal, no dimpling or skin changes noted   Abdomen: soft, non-tender, without masses or organomegaly   Vulva: normal , no lesions   Vagina: normal , no lesions or dryness   Urethra: normal   Urethal meatus normal   Bladder Normal, soft, non-tender and no prolapse or masses appreciated   Cervix: normal, no palpable masses    Uterus: normal , non-tender, not enlarged, no palpable masses   Adnexa: normal, non-tender without fullness or masses   Lymphatic Palpation of lymph nodes in neck, axilla, groin and/or other locations: no lymphadenopathy or masses noted   Skin Normal skin turgor and no rashes    Palpation of skin and subcutaneous tissue normal

## 2019-05-23 ENCOUNTER — OFFICE VISIT (OUTPATIENT)
Dept: FAMILY MEDICINE CLINIC | Facility: CLINIC | Age: 59
End: 2019-05-23
Payer: COMMERCIAL

## 2019-05-23 ENCOUNTER — CLINICAL SUPPORT (OUTPATIENT)
Dept: FAMILY MEDICINE CLINIC | Facility: CLINIC | Age: 59
End: 2019-05-23
Payer: COMMERCIAL

## 2019-05-23 VITALS
SYSTOLIC BLOOD PRESSURE: 120 MMHG | RESPIRATION RATE: 18 BRPM | BODY MASS INDEX: 30.54 KG/M2 | WEIGHT: 183.3 LBS | DIASTOLIC BLOOD PRESSURE: 76 MMHG | HEART RATE: 69 BPM | HEIGHT: 65 IN

## 2019-05-23 DIAGNOSIS — Z13.820 SCREENING FOR OSTEOPOROSIS: ICD-10-CM

## 2019-05-23 DIAGNOSIS — Z00.00 HEALTH MAINTENANCE EXAMINATION: Primary | ICD-10-CM

## 2019-05-23 DIAGNOSIS — Z12.39 SCREENING FOR MALIGNANT NEOPLASM OF BREAST: ICD-10-CM

## 2019-05-23 DIAGNOSIS — E66.9 OBESITY (BMI 30.0-34.9): ICD-10-CM

## 2019-05-23 DIAGNOSIS — Z23 NEED FOR SHINGLES VACCINE: Primary | ICD-10-CM

## 2019-05-23 PROCEDURE — 99396 PREV VISIT EST AGE 40-64: CPT

## 2019-05-23 PROCEDURE — 90471 IMMUNIZATION ADMIN: CPT

## 2019-05-23 PROCEDURE — 90750 HZV VACC RECOMBINANT IM: CPT

## 2019-05-24 ENCOUNTER — TELEPHONE (OUTPATIENT)
Dept: FAMILY MEDICINE CLINIC | Facility: CLINIC | Age: 59
End: 2019-05-24

## 2019-05-24 DIAGNOSIS — Z13.820 SCREENING FOR OSTEOPOROSIS: Primary | ICD-10-CM

## 2019-05-30 LAB
ALBUMIN SERPL-MCNC: 4.2 G/DL (ref 3.6–5.1)
ALBUMIN/GLOB SERPL: 1.6 (CALC) (ref 1–2.5)
ALP SERPL-CCNC: 48 U/L (ref 33–130)
ALT SERPL-CCNC: 16 U/L (ref 6–29)
AST SERPL-CCNC: 22 U/L (ref 10–35)
BASOPHILS # BLD AUTO: 42 CELLS/UL (ref 0–200)
BASOPHILS NFR BLD AUTO: 0.9 %
BILIRUB SERPL-MCNC: 0.8 MG/DL (ref 0.2–1.2)
BUN SERPL-MCNC: 17 MG/DL (ref 7–25)
BUN/CREAT SERPL: NORMAL (CALC) (ref 6–22)
CALCIUM SERPL-MCNC: 9.4 MG/DL (ref 8.6–10.4)
CHLORIDE SERPL-SCNC: 105 MMOL/L (ref 98–110)
CHOLEST SERPL-MCNC: 192 MG/DL
CHOLEST/HDLC SERPL: 2.8 (CALC)
CO2 SERPL-SCNC: 27 MMOL/L (ref 20–32)
CREAT SERPL-MCNC: 0.89 MG/DL (ref 0.5–0.99)
EOSINOPHIL # BLD AUTO: 193 CELLS/UL (ref 15–500)
EOSINOPHIL NFR BLD AUTO: 4.1 %
ERYTHROCYTE [DISTWIDTH] IN BLOOD BY AUTOMATED COUNT: 12.7 % (ref 11–15)
GLOBULIN SER CALC-MCNC: 2.6 G/DL (CALC) (ref 1.9–3.7)
GLUCOSE SERPL-MCNC: 83 MG/DL (ref 65–99)
HCT VFR BLD AUTO: 42.7 % (ref 35–45)
HDLC SERPL-MCNC: 68 MG/DL
HGB BLD-MCNC: 14.3 G/DL (ref 11.7–15.5)
LDLC SERPL CALC-MCNC: 109 MG/DL (CALC)
LYMPHOCYTES # BLD AUTO: 1382 CELLS/UL (ref 850–3900)
LYMPHOCYTES NFR BLD AUTO: 29.4 %
MCH RBC QN AUTO: 30.8 PG (ref 27–33)
MCHC RBC AUTO-ENTMCNC: 33.5 G/DL (ref 32–36)
MCV RBC AUTO: 91.8 FL (ref 80–100)
MONOCYTES # BLD AUTO: 334 CELLS/UL (ref 200–950)
MONOCYTES NFR BLD AUTO: 7.1 %
NEUTROPHILS # BLD AUTO: 2750 CELLS/UL (ref 1500–7800)
NEUTROPHILS NFR BLD AUTO: 58.5 %
NONHDLC SERPL-MCNC: 124 MG/DL (CALC)
PLATELET # BLD AUTO: 274 THOUSAND/UL (ref 140–400)
PMV BLD REES-ECKER: 9.8 FL (ref 7.5–12.5)
POTASSIUM SERPL-SCNC: 4.2 MMOL/L (ref 3.5–5.3)
PROT SERPL-MCNC: 6.8 G/DL (ref 6.1–8.1)
RBC # BLD AUTO: 4.65 MILLION/UL (ref 3.8–5.1)
SL AMB EGFR AFRICAN AMERICAN: 82 ML/MIN/1.73M2
SL AMB EGFR NON AFRICAN AMERICAN: 70 ML/MIN/1.73M2
SODIUM SERPL-SCNC: 140 MMOL/L (ref 135–146)
TRIGL SERPL-MCNC: 64 MG/DL
TSH SERPL-ACNC: 2.24 MIU/L (ref 0.4–4.5)
WBC # BLD AUTO: 4.7 THOUSAND/UL (ref 3.8–10.8)

## 2019-06-26 ENCOUNTER — HOSPITAL ENCOUNTER (OUTPATIENT)
Dept: BONE DENSITY | Facility: IMAGING CENTER | Age: 59
Discharge: HOME | End: 2019-06-26
Payer: COMMERCIAL

## 2019-06-26 VITALS — HEIGHT: 65 IN | BODY MASS INDEX: 29.99 KG/M2 | WEIGHT: 180 LBS

## 2019-06-26 DIAGNOSIS — Z13.820 SCREENING FOR OSTEOPOROSIS: ICD-10-CM

## 2019-06-26 DIAGNOSIS — Z12.39 SCREENING FOR MALIGNANT NEOPLASM OF BREAST: ICD-10-CM

## 2019-06-26 PROCEDURE — 77080 DXA BONE DENSITY AXIAL: CPT

## 2019-06-26 PROCEDURE — 77067 SCR MAMMO BI INCL CAD: CPT

## 2019-08-13 ENCOUNTER — ANNUAL EXAM (OUTPATIENT)
Dept: OBGYN CLINIC | Facility: MEDICAL CENTER | Age: 59
End: 2019-08-13
Payer: COMMERCIAL

## 2019-08-13 VITALS
DIASTOLIC BLOOD PRESSURE: 70 MMHG | SYSTOLIC BLOOD PRESSURE: 112 MMHG | HEIGHT: 65 IN | BODY MASS INDEX: 30.56 KG/M2 | WEIGHT: 183.4 LBS

## 2019-08-13 DIAGNOSIS — Z01.419 ENCOUNTER FOR GYNECOLOGICAL EXAMINATION (GENERAL) (ROUTINE) WITHOUT ABNORMAL FINDINGS: Primary | ICD-10-CM

## 2019-08-13 DIAGNOSIS — N95.1 MENOPAUSAL SYMPTOMS: ICD-10-CM

## 2019-08-13 PROBLEM — M85.852 OSTEOPENIA OF LEFT HIP: Status: ACTIVE | Noted: 2019-08-13

## 2019-08-13 PROCEDURE — 99396 PREV VISIT EST AGE 40-64: CPT | Performed by: OBSTETRICS & GYNECOLOGY

## 2019-08-13 RX ORDER — ESTRADIOL 0.1 MG/G
1 CREAM VAGINAL 2 TIMES WEEKLY
Qty: 127.5 G | Refills: 3 | Status: SHIPPED | OUTPATIENT
Start: 2019-08-15 | End: 2020-10-07 | Stop reason: SDUPTHER

## 2019-08-13 NOTE — PATIENT INSTRUCTIONS
Thank you for your confidence in our team    We appreciate you and welcome your feedback  If you receive a survey from us, please take a few moments to let us know how we are doing     Sincerely,   Nely Saul MD

## 2019-08-13 NOTE — PROGRESS NOTES
ASSESSMENT & PLAN: Suellen Rae was seen today for gynecologic exam     Diagnoses and all orders for this visit:    Encounter for gynecological examination (general) (routine) without abnormal findings    Menopausal symptoms  -     estradiol (ESTRACE VAGINAL) 0 1 mg/g vaginal cream; Insert 1 g into the vagina 2 (two) times a week        1  Routine well woman exam done today  2  Pap and HPV:  The patient's pap is up to date  Pap and cotesting was not done today  Current ASCCP Guidelines reviewed  3   Mammogram was ordered  4  Colonoscopy is up to date  4  The following were reviewed in today's visit: adequate intake of calcium and vitamin D, exercise and healthy diet  5  F/u 1 year  6  Refill of Estrace cream sent  CC:  Annual Gynecologic Examination    HPI: Manan Martin is a 59 y o  who presents for annual gynecologic examination  She has the following concerns:  New diagnosis of osteopenia  Is taking care of mother who broke her leg, aged 80 y o , in hospice care  Also helping her daughter move from her rented town house to a house  Health Maintenance:    Patient describes her health as excellent  Patient has weight concerns  She exercises 3-4 days per week with walking and swimming  She does wear her seatbelt routinely  She does not perform regular monthly self breast exams  She does feel safe at home  Patients does follow a healthy diet  Patient gets 2 servings of dairy or calcium rich foods a day      Last pap: s/p hyster  Last mammogram: already schedule for this year  Last colonoscopy: 2017  DEXA: 2019    Patient Active Problem List   Diagnosis    Arthritis    Cyst of left kidney    Dry eye syndrome    Hemorrhoids    Postmenopause atrophic vaginitis    Squamous cell carcinoma of skin    Vitamin D deficiency    Health maintenance examination    Benign cyst of left kidney    Osteopenia of left hip       Past Medical History:   Diagnosis Date    Endometriosis     Frozen shoulder     Pes anserine bursitis     Pityriasis rosea        Past Surgical History:   Procedure Laterality Date    BLADDER SURGERY      endometrial madd removed from bladder during BSO     SECTION      HYSTERECTOMY      age 37   Vena Vashti KNEE ARTHROSCOPY      medical meniscus tear and arthritis scraping     MOHS SURGERY      chemosurgery    OOPHORECTOMY Bilateral     age 37       Past OB/Gyn History:    History of abnormal pap smears: No    Patient is currently sexually active  heterosexual  Patient's family planning method is status post hysterectomy      Family History   Problem Relation Age of Onset    Heart defect Mother     Parkinsonism Mother     Heart disease Mother         cardiac disorder    Heart defect Father     Heart disease Father         cardiac disorder    Aneurysm Sister     Cerebral aneurysm Sister     Arthritis Brother     Heart defect Brother     Hypertension Brother     Stroke Brother         ischemic    Kidney cancer Brother     Osteoporosis Brother     Heart disease Brother     No Known Problems Maternal Grandmother     No Known Problems Maternal Grandfather     No Known Problems Paternal Grandmother     No Known Problems Paternal Grandfather     Heart disease Other         cardiac disorder    No Known Problems Daughter     No Known Problems Sister     No Known Problems Sister     No Known Problems Daughter     No Known Problems Daughter     No Known Problems Maternal Aunt     No Known Problems Maternal Aunt     No Known Problems Paternal Aunt        Social History:  Social History     Socioeconomic History    Marital status: /Civil Union     Spouse name: Not on file    Number of children: Not on file    Years of education: Not on file    Highest education level: Not on file   Occupational History    Not on file   Social Needs    Financial resource strain: Not on file    Food insecurity:     Worry: Not on file     Inability: Not on file  Transportation needs:     Medical: Not on file     Non-medical: Not on file   Tobacco Use    Smoking status: Never Smoker    Smokeless tobacco: Never Used   Substance and Sexual Activity    Alcohol use: Yes     Comment: social    Drug use: No    Sexual activity: Yes     Partners: Male     Birth control/protection: Female Sterilization   Lifestyle    Physical activity:     Days per week: Not on file     Minutes per session: Not on file    Stress: Not on file   Relationships    Social connections:     Talks on phone: Not on file     Gets together: Not on file     Attends [employer] service: Not on file     Active member of club or organization: Not on file     Attends meetings of clubs or organizations: Not on file     Relationship status: Not on file    Intimate partner violence:     Fear of current or ex partner: Not on file     Emotionally abused: Not on file     Physically abused: Not on file     Forced sexual activity: Not on file   Other Topics Concern    Not on file   Social History Narrative    Always uses a seatbelt    Caffeine use    Has smoke detectors      Presently lives with  and daughter  Patient is currently retired  Allergies   Allergen Reactions    Other Allergic Rhinitis         Current Outpatient Medications:     Calcium-Magnesium-Vitamin D (CALCIUM 500 PO), Take by mouth daily, Disp: , Rfl:     [START ON 8/15/2019] estradiol (ESTRACE VAGINAL) 0 1 mg/g vaginal cream, Insert 1 g into the vagina 2 (two) times a week, Disp: 127 5 g, Rfl: 3    Horse [place] 300 MG CAPS, Take by mouth, Disp: , Rfl:     Krill Oil 1000 MG CAPS, Take 1 capsule by mouth daily, Disp: , Rfl:     VITAMIN B COMPLEX-C CAPS, Take 1 capsule by mouth daily, Disp: , Rfl:     Review of Systems  Constitutional :no fever, feels well, no tiredness, no recent weight gain or loss  ENT: no ear ache, no loss of hearing, no nosebleeds or nasal discharge, no sore throat or hoarseness    Cardiovascular: no complaints of slow or fast heart beat, no chest pain, no palpitations, no leg claudication or lower extremity edema  Respiratory: no complaints of shortness of shortness of breath, no MEZA  Breasts:no complaints of breast pain, breast lump, or nipple discharge  Gastrointestinal: no complaints of abdominal pain, constipation, nausea, vomiting, or diarrhea or bloody stools  Genitourinary : no complaints of dysuria, incontinence, pelvic pain, no dysmenorrhea, vaginal discharge or abnormal vaginal bleeding and as noted in HPI  Musculoskeletal: no complaints of arthralgia, no myalgia, no joint swelling or stiffness, no limb pain or swelling  Integumentary: no complaints of skin rash or lesion, itching or dry skin  Neurological: no complaints of headache, no confusion, no numbness or tingling, no dizziness or fainting    Physical Exam:     /70   Ht 5' 5" (1 651 m)   Wt 83 2 kg (183 lb 6 4 oz)   BMI 30 52 kg/m²     General: appears stated age, cooperative, alert normal mood and affect   Psychiatric oriented to person, place and time  Mood and affect normal   Neck: normal, supple,trachea midline, no masses  Thyroid: normal, no thyromegaly   Heart: regular rate and rhythm, S1, S2 normal, no murmur, click, rub or gallop   Lungs: clear to auscultation bilaterally, no increased work of breathing or signs of respiratory distress   Breasts: normal, no dimpling or skin changes noted   Abdomen: soft, non-tender, without masses or organomegaly   Vulva: normal , no lesions   Vagina: normal , no lesions or dryness   Urethra: normal   Urethal meatus normal   Bladder Normal, soft, non-tender and no prolapse or masses appreciated   Cervix: Surgically absent   Uterus: Surgical absent   Adnexa: normal, non-tender without fullness or masses   Lymphatic Palpation of lymph nodes in neck, axilla, groin and/or other locations: no lymphadenopathy or masses noted   Skin Normal skin turgor and no rashes    Palpation of skin and subcutaneous tissue normal

## 2019-09-24 ENCOUNTER — CLINICAL SUPPORT (OUTPATIENT)
Dept: FAMILY MEDICINE CLINIC | Facility: CLINIC | Age: 59
End: 2019-09-24
Payer: COMMERCIAL

## 2019-09-24 DIAGNOSIS — Z23 NEED FOR VACCINATION: Primary | ICD-10-CM

## 2019-09-24 PROCEDURE — 90471 IMMUNIZATION ADMIN: CPT

## 2019-09-24 PROCEDURE — 90750 HZV VACC RECOMBINANT IM: CPT

## 2020-02-15 ENCOUNTER — OFFICE VISIT (OUTPATIENT)
Dept: URGENT CARE | Facility: CLINIC | Age: 60
End: 2020-02-15
Payer: COMMERCIAL

## 2020-02-15 VITALS
OXYGEN SATURATION: 96 % | HEART RATE: 72 BPM | WEIGHT: 184 LBS | BODY MASS INDEX: 29.57 KG/M2 | TEMPERATURE: 99 F | DIASTOLIC BLOOD PRESSURE: 75 MMHG | HEIGHT: 66 IN | RESPIRATION RATE: 18 BRPM | SYSTOLIC BLOOD PRESSURE: 148 MMHG
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
## 2020-02-15 PROCEDURE — G0382 LEV 3 HOSP TYPE B ED VISIT: HCPCS | Performed by: PHYSICIAN ASSISTANT

## 2020-02-15 PROCEDURE — 90714 TD VACC NO PRESV 7 YRS+ IM: CPT | Performed by: PHYSICIAN ASSISTANT

## 2020-02-15 PROCEDURE — 90471 IMMUNIZATION ADMIN: CPT | Performed by: PHYSICIAN ASSISTANT

## 2020-02-15 PROCEDURE — 90715 TDAP VACCINE 7 YRS/> IM: CPT

## 2020-02-15 RX ORDER — AMOXICILLIN AND CLAVULANATE POTASSIUM 875; 125 MG/1; MG/1
1 TABLET, FILM COATED ORAL EVERY 12 HOURS SCHEDULED
Qty: 10 TABLET | Refills: 0 | Status: SHIPPED | OUTPATIENT
Start: 2020-02-15 | End: 2020-02-20

## 2020-06-30 ENCOUNTER — OFFICE VISIT (OUTPATIENT)
Dept: FAMILY MEDICINE CLINIC | Facility: CLINIC | Age: 60
End: 2020-06-30
Payer: COMMERCIAL

## 2020-06-30 VITALS
BODY MASS INDEX: 30.26 KG/M2 | SYSTOLIC BLOOD PRESSURE: 130 MMHG | WEIGHT: 181.6 LBS | HEART RATE: 72 BPM | RESPIRATION RATE: 16 BRPM | DIASTOLIC BLOOD PRESSURE: 68 MMHG | HEIGHT: 65 IN

## 2020-06-30 DIAGNOSIS — Z20.828 SARS-ASSOCIATED CORONAVIRUS EXPOSURE: ICD-10-CM

## 2020-06-30 DIAGNOSIS — Z00.00 ANNUAL PHYSICAL EXAM: Primary | ICD-10-CM

## 2020-06-30 DIAGNOSIS — Z12.31 ENCOUNTER FOR SCREENING MAMMOGRAM FOR BREAST CANCER: ICD-10-CM

## 2020-06-30 PROCEDURE — 99396 PREV VISIT EST AGE 40-64: CPT | Performed by: PHYSICIAN ASSISTANT

## 2020-06-30 PROCEDURE — 3008F BODY MASS INDEX DOCD: CPT | Performed by: PHYSICIAN ASSISTANT

## 2020-06-30 RX ORDER — DOXYCYCLINE 50 MG/1
50 TABLET ORAL 3 TIMES WEEKLY
COMMUNITY
End: 2021-08-16

## 2020-06-30 NOTE — PATIENT INSTRUCTIONS

## 2020-06-30 NOTE — PROGRESS NOTES
BMI Counseling: Body mass index is 29 99 kg/m²  The BMI is above normal  Nutrition recommendations include reducing portion sizes, decreasing overall calorie intake and 3-5 servings of fruits/vegetables daily  Exercise recommendations include moderate aerobic physical activity for 150 minutes/week  ADULT ANNUAL PHYSICAL  [facility]    NAME: J Carlos Gordillo  AGE: 62 y o  SEX: female  : 1960     DATE: 2020     Assessment and Plan:     Healthy 62year old female    Immunizations and preventive care screenings were discussed with patient today  Appropriate education was printed on patient's after visit summary  Counseling:  Alcohol/drug use: discussed moderation in alcohol intake, the recommendations for healthy alcohol use, and avoidance of illicit drug use  Dental Health: discussed importance of regular tooth brushing, flossing, and dental visits  Injury prevention: discussed safety/seat belts, safety helmets, smoke detectors, carbon dioxide detectors, and smoking near bedding or upholstery  Sexual health: discussed sexually transmitted diseases, partner selection, use of condoms, avoidance of unintended pregnancy, and contraceptive alternatives  · Exercise: the importance of regular exercise/physical activity was discussed  Recommend exercise 3-5 times per week for at least 30 minutes  · Patients mother  from possible cvoid 23, both sister and brother were diagnosed with it after the death, patient was never tested, feeling fine now requesting antibody testing  Is aware of the positives and negatives of testing and would like to proceed  This will not change her masking or social distancing measure  · Labs ordered for yearly blood work per patient request         Return in 1 year (on 2021)       Chief Complaint:     Chief Complaint   Patient presents with    Physical Exam      History of Present Illness:     Adult Annual Physical   Patient here for a comprehensive physical exam  The patient reports no problems  Diet and Physical Activity  · Diet/Nutrition: well balanced diet  · Exercise: walking  Depression Screening  PHQ-9 Depression Screening    PHQ-9:    Frequency of the following problems over the past two weeks:       Little interest or pleasure in doing things:  0 - not at all  Feeling down, depressed, or hopeless:  0 - not at all  PHQ-2 Score:  0       General Health  · Sleep: sleeps well and gets 7-8 hours of sleep on average  · Hearing: normal - bilateral   · Vision: goes for regular eye exams, most recent eye exam <1 year ago and wears glasses  · Dental: regular dental visits and brushes teeth twice daily  /GYN Health  · Patient is: postmenopausal  · Mammo due now  · Colon due        Review of Systems:     Review of Systems   Constitutional: Negative  HENT: Negative  Eyes: Negative  Respiratory: Negative  Cardiovascular: Negative  Gastrointestinal: Negative  Endocrine: Negative  Genitourinary: Negative  Musculoskeletal: Negative  Skin: Negative  Allergic/Immunologic: Negative  Neurological: Negative  Hematological: Negative  Psychiatric/Behavioral: Negative         Past Medical History:     Past Medical History:   Diagnosis Date    Endometriosis     Frozen shoulder     Pes anserine bursitis     Pityriasis rosea       Past Surgical History:     Past Surgical History:   Procedure Laterality Date    BLADDER SURGERY      endometrial madd removed from bladder during BSO     SECTION      HYSTERECTOMY      age 37   [facility] KNEE ARTHROSCOPY      medical meniscus tear and arthritis scraping     MOHS SURGERY      chemosurgery    OOPHORECTOMY Bilateral     age 37      Social History:        Social History     Socioeconomic History    Marital status: /Civil Union     Spouse name: None    Number of children: None    Years of education: None    Highest education level: None   Occupational History    None   Social Needs    Financial resource strain: None    Food insecurity:     Worry: None     Inability: None    Transportation needs:     Medical: None     Non-medical: None   Tobacco Use    Smoking status: Never Smoker    Smokeless tobacco: Never Used   Substance and Sexual Activity    Alcohol use: Yes     Comment: social    Drug use: No    Sexual activity: Yes     Partners: Male     Birth control/protection: Female Sterilization   Lifestyle    Physical activity:     Days per week: None     Minutes per session: None    Stress: None   Relationships    Social connections:     Talks on phone: None     Gets together: None     Attends Hinduism service: None     Active member of club or organization: None     Attends meetings of clubs or organizations: None     Relationship status: None    Intimate partner violence:     Fear of current or ex partner: None     Emotionally abused: None     Physically abused: None     Forced sexual activity: None   Other Topics Concern    None   Social History Narrative    Always uses a seatbelt    Caffeine use    Has smoke detectors       Family History:     Family History   Problem Relation Age of Onset    Heart defect Mother     Parkinsonism Mother     Heart disease Mother         cardiac disorder    Heart defect Father     Heart disease Father         cardiac disorder    Aneurysm Sister     Cerebral aneurysm Sister     Arthritis Brother     Heart defect Brother     Hypertension Brother     Stroke Brother         ischemic    Kidney cancer Brother     Osteoporosis Brother     Heart disease Brother     No Known Problems Maternal Grandmother     No Known Problems Maternal Grandfather     No Known Problems Paternal Grandmother     No Known Problems Paternal Grandfather     Heart disease Other         cardiac disorder    No Known Problems Daughter     No Known Problems Sister    [facility] No Known Problems Sister     No Known Problems Daughter     No Known Problems Daughter     No Known Problems Maternal Aunt     No Known Problems Maternal Aunt     No Known Problems Paternal Aunt       Current Medications:     Current Outpatient Medications   Medication Sig Dispense Refill    Calcium-Magnesium-Vitamin D (CALCIUM 500 PO) Take by mouth daily      doxycycline (ADOXA) 50 MG tablet Take 50 mg by mouth 3 (three) times a week      estradiol (ESTRACE VAGINAL) 0 1 mg/g vaginal cream Insert 1 g into the vagina 2 (two) times a week 127 5 g 3    Multiple Vitamins-Calcium (ONE-A-DAY WOMENS PO) Take by mouth      Omega-3 Fatty Acids (OMEGA 3 PO) Take by mouth       No current facility-administered medications for this visit  Allergies: Allergies   Allergen Reactions    Other Allergic Rhinitis      Physical Exam:     /68 (BP Location: Left arm, Patient Position: Sitting, Cuff Size: Standard)   Pulse 72   Resp 16   Ht 5' 5 25" (1 657 m)   Wt 82 4 kg (181 lb 9 6 oz)   Breastfeeding No   BMI 29 99 kg/m²     Physical Exam   Constitutional: She is oriented to person, place, and time  She appears well-developed and well-nourished  HENT:   Head: Normocephalic and atraumatic  Right Ear: External ear normal    Left Ear: External ear normal    Nose: Nose normal    Mouth/Throat: Oropharynx is clear and moist    Eyes: Pupils are equal, round, and reactive to light  Conjunctivae and EOM are normal    Neck: Normal range of motion  Neck supple  No thyromegaly present  Cardiovascular: Normal rate, regular rhythm, normal heart sounds and intact distal pulses  No murmur heard  Pulmonary/Chest: Effort normal and breath sounds normal  No respiratory distress  She has no wheezes  She has no rales  Abdominal: Soft  Bowel sounds are normal  She exhibits no distension and no mass  There is no tenderness  Musculoskeletal: Normal range of motion  She exhibits no edema     Lymphadenopathy:     She has no cervical adenopathy  Neurological: She is alert and oriented to person, place, and time  She has normal reflexes  No cranial nerve deficit  Skin: Skin is warm and dry  Psychiatric: She has a normal mood and affect   Judgment normal        Marjorie Paniagua PA-C  [address]

## 2020-07-02 LAB
ALBUMIN SERPL-MCNC: 4.1 G/DL (ref 3.6–5.1)
ALBUMIN/GLOB SERPL: 1.7 (CALC) (ref 1–2.5)
ALP SERPL-CCNC: 43 U/L (ref 37–153)
ALT SERPL-CCNC: 22 U/L (ref 6–29)
APPEARANCE UR: CLEAR
AST SERPL-CCNC: 27 U/L (ref 10–35)
BASOPHILS # BLD AUTO: 41 CELLS/UL (ref 0–200)
BASOPHILS NFR BLD AUTO: 0.9 %
BILIRUB SERPL-MCNC: 1.1 MG/DL (ref 0.2–1.2)
BILIRUB UR QL STRIP: NEGATIVE
BUN SERPL-MCNC: 15 MG/DL (ref 7–25)
BUN/CREAT SERPL: NORMAL (CALC) (ref 6–22)
CALCIUM SERPL-MCNC: 9.4 MG/DL (ref 8.6–10.4)
CHLORIDE SERPL-SCNC: 106 MMOL/L (ref 98–110)
CHOLEST SERPL-MCNC: 236 MG/DL
CHOLEST/HDLC SERPL: 3.1 (CALC)
CO2 SERPL-SCNC: 29 MMOL/L (ref 20–32)
COLOR UR: YELLOW
CREAT SERPL-MCNC: 0.79 MG/DL (ref 0.5–0.99)
EOSINOPHIL # BLD AUTO: 239 CELLS/UL (ref 15–500)
EOSINOPHIL NFR BLD AUTO: 5.2 %
ERYTHROCYTE [DISTWIDTH] IN BLOOD BY AUTOMATED COUNT: 13.1 % (ref 11–15)
GLOBULIN SER CALC-MCNC: 2.4 G/DL (CALC) (ref 1.9–3.7)
GLUCOSE SERPL-MCNC: 84 MG/DL (ref 65–99)
GLUCOSE UR QL STRIP: NEGATIVE
HCT VFR BLD AUTO: 41.5 % (ref 35–45)
HDLC SERPL-MCNC: 75 MG/DL
HGB BLD-MCNC: 14.1 G/DL (ref 11.7–15.5)
HGB UR QL STRIP: NEGATIVE
KETONES UR QL STRIP: NEGATIVE
LDLC SERPL CALC-MCNC: 146 MG/DL (CALC)
LEUKOCYTE ESTERASE UR QL STRIP: NEGATIVE
LYMPHOCYTES # BLD AUTO: 1458 CELLS/UL (ref 850–3900)
LYMPHOCYTES NFR BLD AUTO: 31.7 %
MCH RBC QN AUTO: 31.1 PG (ref 27–33)
MCHC RBC AUTO-ENTMCNC: 34 G/DL (ref 32–36)
MCV RBC AUTO: 91.4 FL (ref 80–100)
MONOCYTES # BLD AUTO: 327 CELLS/UL (ref 200–950)
MONOCYTES NFR BLD AUTO: 7.1 %
NEUTROPHILS # BLD AUTO: 2535 CELLS/UL (ref 1500–7800)
NEUTROPHILS NFR BLD AUTO: 55.1 %
NITRITE UR QL STRIP: NEGATIVE
NONHDLC SERPL-MCNC: 161 MG/DL (CALC)
PH UR STRIP: 6.5 [PH] (ref 5–8)
PLATELET # BLD AUTO: 291 THOUSAND/UL (ref 140–400)
PMV BLD REES-ECKER: 9.7 FL (ref 7.5–12.5)
POTASSIUM SERPL-SCNC: 4.2 MMOL/L (ref 3.5–5.3)
PROT SERPL-MCNC: 6.5 G/DL (ref 6.1–8.1)
PROT UR QL STRIP: NEGATIVE
RBC # BLD AUTO: 4.54 MILLION/UL (ref 3.8–5.1)
SARS-COV-2 IGG SERPL QL IA: POSITIVE
SL AMB EGFR AFRICAN AMERICAN: 94 ML/MIN/1.73M2
SL AMB EGFR NON AFRICAN AMERICAN: 81 ML/MIN/1.73M2
SODIUM SERPL-SCNC: 140 MMOL/L (ref 135–146)
SP GR UR STRIP: 1 (ref 1–1.03)
TRIGL SERPL-MCNC: 59 MG/DL
TSH SERPL-ACNC: 3.05 MIU/L (ref 0.4–4.5)
WBC # BLD AUTO: 4.6 THOUSAND/UL (ref 3.8–10.8)

## 2020-09-14 ENCOUNTER — HOSPITAL ENCOUNTER (OUTPATIENT)
Dept: BONE DENSITY | Facility: IMAGING CENTER | Age: 60
Discharge: HOME | End: 2020-09-14
Payer: COMMERCIAL

## 2020-09-14 VITALS — BODY MASS INDEX: 30.16 KG/M2 | WEIGHT: 181 LBS | HEIGHT: 65 IN

## 2020-09-14 DIAGNOSIS — Z12.31 ENCOUNTER FOR SCREENING MAMMOGRAM FOR BREAST CANCER: ICD-10-CM

## 2020-09-14 PROCEDURE — 77063 BREAST TOMOSYNTHESIS BI: CPT

## 2020-09-14 PROCEDURE — 77067 SCR MAMMO BI INCL CAD: CPT

## 2020-10-07 ENCOUNTER — ANNUAL EXAM (OUTPATIENT)
Dept: OBGYN CLINIC | Facility: MEDICAL CENTER | Age: 60
End: 2020-10-07
Payer: COMMERCIAL

## 2020-10-07 VITALS
TEMPERATURE: 98.5 F | DIASTOLIC BLOOD PRESSURE: 82 MMHG | WEIGHT: 185.3 LBS | SYSTOLIC BLOOD PRESSURE: 140 MMHG | HEIGHT: 65 IN | BODY MASS INDEX: 30.87 KG/M2

## 2020-10-07 DIAGNOSIS — Z01.419 ENCOUNTER FOR GYNECOLOGICAL EXAMINATION (GENERAL) (ROUTINE) WITHOUT ABNORMAL FINDINGS: Primary | ICD-10-CM

## 2020-10-07 DIAGNOSIS — N95.1 MENOPAUSAL SYMPTOMS: ICD-10-CM

## 2020-10-07 DIAGNOSIS — Z12.31 ENCOUNTER FOR SCREENING MAMMOGRAM FOR MALIGNANT NEOPLASM OF BREAST: ICD-10-CM

## 2020-10-07 PROCEDURE — 99396 PREV VISIT EST AGE 40-64: CPT | Performed by: OBSTETRICS & GYNECOLOGY

## 2020-10-07 PROCEDURE — 1036F TOBACCO NON-USER: CPT | Performed by: OBSTETRICS & GYNECOLOGY

## 2020-10-07 RX ORDER — ESTRADIOL 0.1 MG/G
1 CREAM VAGINAL 2 TIMES WEEKLY
Qty: 127.5 G | Refills: 3 | Status: SHIPPED | OUTPATIENT
Start: 2020-10-08 | End: 2021-11-10 | Stop reason: SDUPTHER

## 2021-04-09 DIAGNOSIS — Z23 ENCOUNTER FOR IMMUNIZATION: ICD-10-CM

## 2021-04-25 ENCOUNTER — IMMUNIZATIONS (OUTPATIENT)
Dept: FAMILY MEDICINE CLINIC | Facility: HOSPITAL | Age: 61
End: 2021-04-25

## 2021-04-25 DIAGNOSIS — Z23 ENCOUNTER FOR IMMUNIZATION: Primary | ICD-10-CM

## 2021-04-25 PROCEDURE — 91300 SARS-COV-2 / COVID-19 MRNA VACCINE (PFIZER-BIONTECH) 30 MCG: CPT

## 2021-04-25 PROCEDURE — 0001A SARS-COV-2 / COVID-19 MRNA VACCINE (PFIZER-BIONTECH) 30 MCG: CPT

## 2021-05-19 ENCOUNTER — IMMUNIZATIONS (OUTPATIENT)
Dept: FAMILY MEDICINE CLINIC | Facility: HOSPITAL | Age: 61
End: 2021-05-19

## 2021-05-19 DIAGNOSIS — Z23 ENCOUNTER FOR IMMUNIZATION: Primary | ICD-10-CM

## 2021-05-19 PROCEDURE — 91300 SARS-COV-2 / COVID-19 MRNA VACCINE (PFIZER-BIONTECH) 30 MCG: CPT

## 2021-05-19 PROCEDURE — 0002A SARS-COV-2 / COVID-19 MRNA VACCINE (PFIZER-BIONTECH) 30 MCG: CPT

## 2021-08-16 ENCOUNTER — OFFICE VISIT (OUTPATIENT)
Dept: FAMILY MEDICINE CLINIC | Facility: CLINIC | Age: 61
End: 2021-08-16
Payer: COMMERCIAL

## 2021-08-16 VITALS
OXYGEN SATURATION: 99 % | DIASTOLIC BLOOD PRESSURE: 80 MMHG | RESPIRATION RATE: 16 BRPM | WEIGHT: 181 LBS | SYSTOLIC BLOOD PRESSURE: 142 MMHG | HEART RATE: 70 BPM | HEIGHT: 66 IN | BODY MASS INDEX: 29.09 KG/M2 | TEMPERATURE: 98.3 F

## 2021-08-16 DIAGNOSIS — H10.829 ROSACEA BLEPHAROCONJUNCTIVITIS: Primary | ICD-10-CM

## 2021-08-16 DIAGNOSIS — E01.0 THYROMEGALY: ICD-10-CM

## 2021-08-16 DIAGNOSIS — L71.9 ROSACEA, ACNE: ICD-10-CM

## 2021-08-16 DIAGNOSIS — N28.1 RENAL CYST: ICD-10-CM

## 2021-08-16 DIAGNOSIS — Z00.00 ANNUAL PHYSICAL EXAM: ICD-10-CM

## 2021-08-16 PROBLEM — H25.9 AGE-RELATED CATARACT OF BOTH EYES: Status: ACTIVE | Noted: 2017-02-17

## 2021-08-16 PROBLEM — H43.819 POSTERIOR VITREOUS DETACHMENT: Status: ACTIVE | Noted: 2021-01-25

## 2021-08-16 PROBLEM — H04.123 INSUFFICIENCY OF TEAR FILM OF BOTH EYES: Status: ACTIVE | Noted: 2021-06-24

## 2021-08-16 PROBLEM — L71.8 ROSACEA CONJUNCTIVITIS: Status: ACTIVE | Noted: 2021-01-25

## 2021-08-16 PROCEDURE — 3008F BODY MASS INDEX DOCD: CPT | Performed by: PHYSICIAN ASSISTANT

## 2021-08-16 PROCEDURE — 3725F SCREEN DEPRESSION PERFORMED: CPT | Performed by: PHYSICIAN ASSISTANT

## 2021-08-16 PROCEDURE — 99396 PREV VISIT EST AGE 40-64: CPT | Performed by: PHYSICIAN ASSISTANT

## 2021-08-16 PROCEDURE — 1036F TOBACCO NON-USER: CPT | Performed by: PHYSICIAN ASSISTANT

## 2021-08-16 NOTE — PATIENT INSTRUCTIONS

## 2021-08-16 NOTE — PROGRESS NOTES
ADULT ANNUAL PHYSICAL  [facility]    NAME: Teresita Guillen  AGE: 56 y o  SEX: female  : 1960     DATE: 2021     Assessment and Plan:     Healthy 89year old female    Immunizations and preventive care screenings were discussed with patient today  Appropriate education was printed on patient's after visit summary  Counseling:  Alcohol/drug use: discussed moderation in alcohol intake, the recommendations for healthy alcohol use, and avoidance of illicit drug use  Dental Health: discussed importance of regular tooth brushing, flossing, and dental visits  Injury prevention: discussed safety/seat belts, safety helmets, smoke detectors, carbon dioxide detectors, and smoking near bedding or upholstery  Sexual health: discussed sexually transmitted diseases, partner selection, use of condoms, avoidance of unintended pregnancy, and contraceptive alternatives  · Exercise: the importance of regular exercise/physical activity was discussed  Recommend exercise 3-5 times per week for at least 30 minutes  · Labs ordered for patient         Return in 1 year (on 2022)  Chief Complaint:     Chief Complaint   Patient presents with    Annual Exam      History of Present Illness:     Adult Annual Physical   Patient here for a comprehensive physical exam  The patient reports no problems  Diet and Physical Activity  · Diet/Nutrition: well balanced diet  · Exercise: walking  Depression Screening  PHQ-9 Depression Screening    PHQ-9:   Frequency of the following problems over the past two weeks:      Little interest or pleasure in doing things: 0 - not at all  Feeling down, depressed, or hopeless: 0 - not at all  PHQ-2 Score: 0       General Health  · Sleep: sleeps well and gets 7-8 hours of sleep on average  · Hearing: normal - bilateral   · Vision: goes for regular eye exams     · Dental: regular dental visits  /GYN Health  · Patient is: postmenopausal  · Mammo/colon up to date     Review of Systems:     Review of Systems   Constitutional: Negative  HENT: Negative  Eyes: Negative  Respiratory: Negative  Cardiovascular: Negative  Gastrointestinal: Negative  Endocrine: Negative  Genitourinary: Negative  Musculoskeletal: Negative  Skin: Negative  Allergic/Immunologic: Negative  Neurological: Negative  Hematological: Negative  Psychiatric/Behavioral: Negative         Past Medical History:     Past Medical History:   Diagnosis Date    Endometriosis     Frozen shoulder     Pes anserine bursitis     Pityriasis rosea       Past Surgical History:     Past Surgical History:   Procedure Laterality Date    BLADDER SURGERY      endometrial madd removed from bladder during BSO     SECTION      HYSTERECTOMY      age 37   [facility] KNEE ARTHROSCOPY      medical meniscus tear and arthritis scraping     MOHS SURGERY      chemosurgery    OOPHORECTOMY Bilateral     age 37      Social History:     Social History     Socioeconomic History    Marital status: /Civil Union     Spouse name: None    Number of children: None    Years of education: None    Highest education level: None   Occupational History    None   Tobacco Use    Smoking status: Never Smoker    Smokeless tobacco: Never Used   Vaping Use    Vaping Use: Never used   Substance and Sexual Activity    Alcohol use: Yes     Comment: social    Drug use: No    Sexual activity: Yes     Partners: Male     Birth control/protection: Female Sterilization   Other Topics Concern    None   Social History Narrative    Always uses a seatbelt    Caffeine use    Has smoke detectors      Social Determinants of Health     Financial Resource Strain:     Difficulty of Paying Living Expenses:    Food Insecurity:     Worried About Running Out of Food in the Last Year:     Ran Out of Food in the Last Year:    Transportation Needs:     Lack of Transportation (Medical):      Lack of Transportation (Non-Medical):    Physical Activity:     Days of Exercise per Week:     Minutes of Exercise per Session:    Stress:     Feeling of Stress :    Social Connections:     Frequency of Communication with Friends and Family:     Frequency of Social Gatherings with Friends and Family:     Attends Pentecostal Services:     Active Member of Clubs or Organizations:     Attends Club or Organization Meetings:     Marital Status:    Intimate Partner Violence:     Fear of Current or Ex-Partner:     Emotionally Abused:     Physically Abused:     Sexually Abused:       Family History:     Family History   Problem Relation Age of Onset    Heart defect Mother     Parkinsonism Mother     Heart disease Mother         cardiac disorder    Heart defect Father     Heart disease Father         cardiac disorder    Aneurysm Sister     Cerebral aneurysm Sister     Arthritis Brother     Heart defect Brother     Hypertension Brother     Stroke Brother         ischemic    Kidney cancer Brother     Osteoporosis Brother     Heart disease Brother     No Known Problems Maternal Grandmother     No Known Problems Maternal Grandfather     No Known Problems Paternal Grandmother     No Known Problems Paternal Grandfather     Heart disease Other         cardiac disorder    No Known Problems Daughter     No Known Problems Sister     No Known Problems Sister     No Known Problems Daughter     No Known Problems Daughter     No Known Problems Maternal Aunt     No Known Problems Maternal Aunt     No Known Problems Paternal Aunt       Current Medications:     Current Outpatient Medications   Medication Sig Dispense Refill    estradiol (ESTRACE VAGINAL) 0 1 mg/g vaginal cream Insert 1 g into the vagina 2 (two) times a week 127 5 g 3    Calcium-Magnesium-Vitamin D (CALCIUM 500 PO) Take by mouth daily (Patient not taking: Reported on 8/16/2021)      Multiple Vitamins-Calcium (ONE-A-DAY WOMENS PO) Take by mouth (Patient not taking: Reported on 8/16/2021)      Omega-3 Fatty Acids (OMEGA 3 PO) Take by mouth (Patient not taking: Reported on 8/16/2021)       No current facility-administered medications for this visit  Allergies: Allergies   Allergen Reactions    Other Allergic Rhinitis      Physical Exam:     /80 (BP Location: Left arm, Patient Position: Sitting, Cuff Size: Adult)   Pulse 70   Temp 98 3 °F (36 8 °C) (Temporal)   Resp 16   Ht 5' 5 5" (1 664 m)   Wt 82 1 kg (181 lb)   LMP  (LMP Unknown)   SpO2 99%   BMI 29 66 kg/m²     Physical Exam  Constitutional:       Appearance: Normal appearance  She is well-developed and normal weight  HENT:      Head: Normocephalic and atraumatic  Right Ear: Hearing, tympanic membrane, ear canal and external ear normal       Left Ear: Hearing, tympanic membrane, ear canal and external ear normal       Nose: Nose normal       Mouth/Throat:      Mouth: Mucous membranes are moist       Pharynx: Oropharynx is clear  Uvula midline  Eyes:      Extraocular Movements: Extraocular movements intact  Conjunctiva/sclera: Conjunctivae normal       Pupils: Pupils are equal, round, and reactive to light  Neck:      Thyroid: No thyromegaly  Cardiovascular:      Rate and Rhythm: Normal rate and regular rhythm  Pulses: Normal pulses  Heart sounds: Normal heart sounds  No murmur heard  Pulmonary:      Effort: Pulmonary effort is normal       Breath sounds: Normal breath sounds  Abdominal:      General: Bowel sounds are normal  There is no distension  Palpations: Abdomen is soft  There is no mass  Tenderness: There is no abdominal tenderness  Musculoskeletal:         General: Normal range of motion  Cervical back: Normal range of motion and neck supple  Lymphadenopathy:      Cervical: No cervical adenopathy  Skin:     General: Skin is warm     Neurological: General: No focal deficit present  Mental Status: She is alert and oriented to person, place, and time  Cranial Nerves: No cranial nerve deficit  Deep Tendon Reflexes: Reflexes normal    Psychiatric:         Mood and Affect: Mood normal          Behavior: Behavior normal          Thought Content: Thought content normal          Judgment: Judgment normal           Kenyon Butler PA-C  [address]     BMI Counseling: Body mass index is 29 66 kg/m²  The BMI is above normal  Nutrition recommendations include reducing portion sizes

## 2021-08-22 LAB
ALBUMIN SERPL-MCNC: 4.4 G/DL (ref 3.6–5.1)
ALBUMIN/GLOB SERPL: 1.9 (CALC) (ref 1–2.5)
ALP SERPL-CCNC: 43 U/L (ref 37–153)
ALT SERPL-CCNC: 16 U/L (ref 6–29)
APPEARANCE UR: CLEAR
AST SERPL-CCNC: 25 U/L (ref 10–35)
BASOPHILS # BLD AUTO: 51 CELLS/UL (ref 0–200)
BASOPHILS NFR BLD AUTO: 1 %
BILIRUB SERPL-MCNC: 0.8 MG/DL (ref 0.2–1.2)
BILIRUB UR QL STRIP: NEGATIVE
BUN SERPL-MCNC: 15 MG/DL (ref 7–25)
BUN/CREAT SERPL: NORMAL (CALC) (ref 6–22)
CALCIUM SERPL-MCNC: 9.7 MG/DL (ref 8.6–10.4)
CHLORIDE SERPL-SCNC: 105 MMOL/L (ref 98–110)
CHOLEST SERPL-MCNC: 213 MG/DL
CHOLEST/HDLC SERPL: 3.3 (CALC)
CO2 SERPL-SCNC: 28 MMOL/L (ref 20–32)
COLOR UR: YELLOW
CREAT SERPL-MCNC: 0.82 MG/DL (ref 0.5–0.99)
EOSINOPHIL # BLD AUTO: 92 CELLS/UL (ref 15–500)
EOSINOPHIL NFR BLD AUTO: 1.8 %
ERYTHROCYTE [DISTWIDTH] IN BLOOD BY AUTOMATED COUNT: 12.8 % (ref 11–15)
GLOBULIN SER CALC-MCNC: 2.3 G/DL (CALC) (ref 1.9–3.7)
GLUCOSE SERPL-MCNC: 89 MG/DL (ref 65–99)
GLUCOSE UR QL STRIP: NEGATIVE
HCT VFR BLD AUTO: 41.3 % (ref 35–45)
HDLC SERPL-MCNC: 65 MG/DL
HGB BLD-MCNC: 14.1 G/DL (ref 11.7–15.5)
HGB UR QL STRIP: NEGATIVE
INTERPRETATION: NORMAL
KETONES UR QL STRIP: NEGATIVE
LDLC SERPL CALC-MCNC: 132 MG/DL (CALC)
LEUKOCYTE ESTERASE UR QL STRIP: NEGATIVE
LYMPHOCYTES # BLD AUTO: 1464 CELLS/UL (ref 850–3900)
LYMPHOCYTES NFR BLD AUTO: 28.7 %
MCH RBC QN AUTO: 31.2 PG (ref 27–33)
MCHC RBC AUTO-ENTMCNC: 34.1 G/DL (ref 32–36)
MCV RBC AUTO: 91.4 FL (ref 80–100)
MONOCYTES # BLD AUTO: 281 CELLS/UL (ref 200–950)
MONOCYTES NFR BLD AUTO: 5.5 %
MTHFR GENE MUT ANL BLD/T: NORMAL
NEUTROPHILS # BLD AUTO: 3213 CELLS/UL (ref 1500–7800)
NEUTROPHILS NFR BLD AUTO: 63 %
NITRITE UR QL STRIP: NEGATIVE
NONHDLC SERPL-MCNC: 148 MG/DL (CALC)
PH UR STRIP: 7.5 [PH] (ref 5–8)
PLATELET # BLD AUTO: 285 THOUSAND/UL (ref 140–400)
PMV BLD REES-ECKER: 10 FL (ref 7.5–12.5)
POTASSIUM SERPL-SCNC: 4.1 MMOL/L (ref 3.5–5.3)
PROT SERPL-MCNC: 6.7 G/DL (ref 6.1–8.1)
PROT UR QL STRIP: NEGATIVE
RBC # BLD AUTO: 4.52 MILLION/UL (ref 3.8–5.1)
SL AMB EGFR AFRICAN AMERICAN: 89 ML/MIN/1.73M2
SL AMB EGFR NON AFRICAN AMERICAN: 77 ML/MIN/1.73M2
SODIUM SERPL-SCNC: 140 MMOL/L (ref 135–146)
SP GR UR STRIP: 1 (ref 1–1.03)
TRIGL SERPL-MCNC: 68 MG/DL
TSH SERPL-ACNC: 1.91 MIU/L (ref 0.4–4.5)
WBC # BLD AUTO: 5.1 THOUSAND/UL (ref 3.8–10.8)

## 2021-08-23 ENCOUNTER — HOSPITAL ENCOUNTER (OUTPATIENT)
Dept: ULTRASOUND IMAGING | Facility: HOSPITAL | Age: 61
Discharge: HOME | End: 2021-08-23
Payer: COMMERCIAL

## 2021-08-23 DIAGNOSIS — E01.0 THYROMEGALY: ICD-10-CM

## 2021-08-23 DIAGNOSIS — N28.1 RENAL CYST: ICD-10-CM

## 2021-08-23 PROCEDURE — 76770 US EXAM ABDO BACK WALL COMP: CPT

## 2021-08-23 PROCEDURE — 76536 US EXAM OF HEAD AND NECK: CPT

## 2021-11-10 ENCOUNTER — ANNUAL EXAM (OUTPATIENT)
Dept: OBGYN CLINIC | Facility: MEDICAL CENTER | Age: 61
End: 2021-11-10
Payer: COMMERCIAL

## 2021-11-10 VITALS
HEIGHT: 66 IN | WEIGHT: 185.7 LBS | DIASTOLIC BLOOD PRESSURE: 72 MMHG | BODY MASS INDEX: 29.84 KG/M2 | SYSTOLIC BLOOD PRESSURE: 122 MMHG

## 2021-11-10 DIAGNOSIS — N95.1 MENOPAUSAL SYMPTOMS: ICD-10-CM

## 2021-11-10 DIAGNOSIS — Z13.820 ENCOUNTER FOR OSTEOPOROSIS SCREENING IN ASYMPTOMATIC POSTMENOPAUSAL PATIENT: ICD-10-CM

## 2021-11-10 DIAGNOSIS — Z01.419 ENCOUNTER FOR GYNECOLOGICAL EXAMINATION (GENERAL) (ROUTINE) WITHOUT ABNORMAL FINDINGS: Primary | ICD-10-CM

## 2021-11-10 DIAGNOSIS — Z87.39 HISTORY OF OSTEOPENIA: ICD-10-CM

## 2021-11-10 DIAGNOSIS — Z12.31 ENCOUNTER FOR SCREENING MAMMOGRAM FOR MALIGNANT NEOPLASM OF BREAST: ICD-10-CM

## 2021-11-10 DIAGNOSIS — Z78.0 ENCOUNTER FOR OSTEOPOROSIS SCREENING IN ASYMPTOMATIC POSTMENOPAUSAL PATIENT: ICD-10-CM

## 2021-11-10 PROCEDURE — 3008F BODY MASS INDEX DOCD: CPT | Performed by: OBSTETRICS & GYNECOLOGY

## 2021-11-10 PROCEDURE — 99396 PREV VISIT EST AGE 40-64: CPT | Performed by: OBSTETRICS & GYNECOLOGY

## 2021-11-10 PROCEDURE — 1036F TOBACCO NON-USER: CPT | Performed by: OBSTETRICS & GYNECOLOGY

## 2021-11-10 RX ORDER — ESTRADIOL 0.1 MG/G
1 CREAM VAGINAL 2 TIMES WEEKLY
Qty: 127.5 G | Refills: 3 | Status: SHIPPED | OUTPATIENT
Start: 2021-11-11

## 2022-02-01 ENCOUNTER — HOSPITAL ENCOUNTER (OUTPATIENT)
Dept: MAMMOGRAPHY | Facility: IMAGING CENTER | Age: 62
Discharge: HOME | End: 2022-02-01
Payer: COMMERCIAL

## 2022-02-01 ENCOUNTER — HOSPITAL ENCOUNTER (OUTPATIENT)
Dept: BONE DENSITY | Facility: IMAGING CENTER | Age: 62
Discharge: HOME | End: 2022-02-01
Payer: COMMERCIAL

## 2022-02-01 VITALS — HEIGHT: 66 IN | WEIGHT: 180 LBS | BODY MASS INDEX: 28.93 KG/M2

## 2022-02-01 DIAGNOSIS — Z12.31 ENCOUNTER FOR SCREENING MAMMOGRAM FOR MALIGNANT NEOPLASM OF BREAST: ICD-10-CM

## 2022-02-01 DIAGNOSIS — Z13.820 ENCOUNTER FOR OSTEOPOROSIS SCREENING IN ASYMPTOMATIC POSTMENOPAUSAL PATIENT: ICD-10-CM

## 2022-02-01 DIAGNOSIS — Z78.0 ENCOUNTER FOR OSTEOPOROSIS SCREENING IN ASYMPTOMATIC POSTMENOPAUSAL PATIENT: ICD-10-CM

## 2022-02-01 DIAGNOSIS — Z87.39 HISTORY OF OSTEOPENIA: ICD-10-CM

## 2022-02-01 PROCEDURE — 77080 DXA BONE DENSITY AXIAL: CPT

## 2022-02-01 PROCEDURE — 77067 SCR MAMMO BI INCL CAD: CPT

## 2022-02-01 PROCEDURE — 77063 BREAST TOMOSYNTHESIS BI: CPT

## 2022-02-02 NOTE — RESULT ENCOUNTER NOTE
Please notify pt of normal mammo; b/l axillary lymph nodes noted  Rec pt f/u with PCP regarding this finding

## 2022-04-08 ENCOUNTER — OFFICE VISIT (OUTPATIENT)
Dept: FAMILY MEDICINE CLINIC | Facility: CLINIC | Age: 62
End: 2022-04-08
Payer: COMMERCIAL

## 2022-04-08 VITALS
RESPIRATION RATE: 15 BRPM | HEART RATE: 72 BPM | HEIGHT: 66 IN | DIASTOLIC BLOOD PRESSURE: 72 MMHG | SYSTOLIC BLOOD PRESSURE: 120 MMHG | OXYGEN SATURATION: 98 % | BODY MASS INDEX: 29.65 KG/M2 | WEIGHT: 184.5 LBS

## 2022-04-08 DIAGNOSIS — R59.0 AXILLARY ADENOPATHY: Primary | ICD-10-CM

## 2022-04-08 DIAGNOSIS — Z12.11 SCREENING FOR MALIGNANT NEOPLASM OF COLON: ICD-10-CM

## 2022-04-08 PROCEDURE — 1036F TOBACCO NON-USER: CPT | Performed by: PHYSICIAN ASSISTANT

## 2022-04-08 PROCEDURE — 3008F BODY MASS INDEX DOCD: CPT | Performed by: PHYSICIAN ASSISTANT

## 2022-04-08 PROCEDURE — 99213 OFFICE O/P EST LOW 20 MIN: CPT | Performed by: PHYSICIAN ASSISTANT

## 2022-04-08 PROCEDURE — 3725F SCREEN DEPRESSION PERFORMED: CPT | Performed by: PHYSICIAN ASSISTANT

## 2022-04-08 NOTE — PROGRESS NOTES
Assessment/Plan:    1  Axillary adenopathy - noted on a routine mammogram done 1 month after covid infection, no palpable adenopathy at this time, breast tissue was normal, will consult radiology but most likely will repeat US in 3-4 months    F/u as scheduled  F/u as needed        Subjective:   Chief Complaint   Patient presents with    Follow-up     mammo results      Patient ID: Michael Covington is a 59 y o  female  Patient here in follow up  Had mammo 22 which showed normal breast tissue but b/l axillary nodes  Patient had covid the beginning of January  Feels well otherwise, denies any weight loss, fatigue, rash         The following portions of the patient's history were reviewed and updated as appropriate: allergies, current medications, past family history, past medical history, past social history, past surgical history and problem list     Past Medical History:   Diagnosis Date    Endometriosis     Frozen shoulder     Pes anserine bursitis     Pityriasis rosea      Past Surgical History:   Procedure Laterality Date    BLADDER SURGERY      endometrial madd removed from bladder during BSO     SECTION      HYSTERECTOMY      age 37   [facility] KNEE ARTHROSCOPY      medical meniscus tear and arthritis scraping     MOHS SURGERY      chemosurgery    OOPHORECTOMY Bilateral     age 37     Family History   Problem Relation Age of Onset    Heart defect Mother     Parkinsonism Mother     Heart disease Mother         cardiac disorder    Heart defect Father     Heart disease Father         cardiac disorder    Aneurysm Sister     Cerebral aneurysm Sister     Arthritis Brother     Heart defect Brother     Hypertension Brother     Stroke Brother         ischemic    Kidney cancer Brother     Osteoporosis Brother     Heart disease Brother     No Known Problems Maternal Grandmother     No Known Problems Maternal Grandfather     No Known Problems Paternal Grandmother     No Known Problems Paternal Grandfather     No Known Problems Daughter     No Known Problems Sister     No Known Problems Sister     No Known Problems Daughter     No Known Problems Daughter     No Known Problems Maternal Aunt     No Known Problems Maternal Aunt     No Known Problems Paternal Aunt      Social History     Socioeconomic History    Marital status: /Civil Union     Spouse name: Not on file    Number of children: Not on file    Years of education: Not on file    Highest education level: Not on file   Occupational History    Not on file   Tobacco Use    Smoking status: Never Smoker    Smokeless tobacco: Never Used   Vaping Use    Vaping Use: Never used   Substance and Sexual Activity    Alcohol use: Yes     Comment: social    Drug use: No    Sexual activity: Yes     Partners: Male     Birth control/protection: Female Sterilization   Other Topics Concern    Not on file   Social History Narrative    Always uses a seatbelt    Caffeine use    Has smoke detectors      Social Determinants of Health     Financial Resource Strain: Not on file   Food Insecurity: Not on file   Transportation Needs: Not on file   Physical Activity: Not on file   Stress: Not on file   Social Connections: Not on file   Intimate Partner Violence: Not on file   Housing Stability: Not on file       Current Outpatient Medications:     estradiol (ESTRACE VAGINAL) 0 1 mg/g vaginal cream, Insert 1 g into the vagina 2 (two) times a week, Disp: 127 5 g, Rfl: 3    Review of Systems          Objective:    Vitals:    04/08/22 1118   BP: 120/72   Pulse: 72   Resp: 15   SpO2: 98%   Weight: 83 7 kg (184 lb 8 oz)   Height: 5' 5 5" (1 664 m)        Physical Exam  Constitutional:       Appearance: Normal appearance  HENT:      Head: Normocephalic and atraumatic  Musculoskeletal:         General: Normal range of motion  Skin:     General: Skin is warm  Neurological:      Mental Status: She is alert     Psychiatric:         Mood and Affect: Mood normal          Behavior: Behavior normal          Thought Content:  Thought content normal          Judgment: Judgment normal

## 2022-04-26 ENCOUNTER — HOSPITAL ENCOUNTER (OUTPATIENT)
Dept: ULTRASOUND IMAGING | Facility: CLINIC | Age: 62
Discharge: HOME | End: 2022-04-26

## 2022-04-26 DIAGNOSIS — R59.0 AXILLARY ADENOPATHY: ICD-10-CM

## 2022-08-03 ENCOUNTER — TELEPHONE (OUTPATIENT)
Dept: FAMILY MEDICINE CLINIC | Facility: CLINIC | Age: 62
End: 2022-08-03

## 2022-08-03 DIAGNOSIS — E01.0 THYROMEGALY: Primary | ICD-10-CM

## 2022-08-03 NOTE — TELEPHONE ENCOUNTER
Patient is asking for an ultrasound order for her thyroid   She said she always gets one from you to be done prior to her appointment      [phone]

## 2022-08-26 ENCOUNTER — HOSPITAL ENCOUNTER (OUTPATIENT)
Dept: ULTRASOUND IMAGING | Facility: HOSPITAL | Age: 62
End: 2022-08-26
Payer: COMMERCIAL

## 2022-08-26 DIAGNOSIS — E01.0 THYROMEGALY: ICD-10-CM

## 2022-08-26 PROCEDURE — 76536 US EXAM OF HEAD AND NECK: CPT

## 2022-09-02 ENCOUNTER — APPOINTMENT (OUTPATIENT)
Dept: RADIOLOGY | Facility: CLINIC | Age: 62
End: 2022-09-02
Payer: COMMERCIAL

## 2022-09-02 ENCOUNTER — OFFICE VISIT (OUTPATIENT)
Dept: FAMILY MEDICINE CLINIC | Facility: CLINIC | Age: 62
End: 2022-09-02
Payer: COMMERCIAL

## 2022-09-02 VITALS
RESPIRATION RATE: 14 BRPM | SYSTOLIC BLOOD PRESSURE: 118 MMHG | HEIGHT: 66 IN | OXYGEN SATURATION: 100 % | WEIGHT: 181.9 LBS | HEART RATE: 59 BPM | DIASTOLIC BLOOD PRESSURE: 74 MMHG | BODY MASS INDEX: 29.23 KG/M2

## 2022-09-02 DIAGNOSIS — M25.551 HIP PAIN, CHRONIC, RIGHT: Primary | ICD-10-CM

## 2022-09-02 DIAGNOSIS — B99.9 RECURRENT INFECTIONS: ICD-10-CM

## 2022-09-02 DIAGNOSIS — G89.29 HIP PAIN, CHRONIC, RIGHT: ICD-10-CM

## 2022-09-02 DIAGNOSIS — K63.5 POLYP OF COLON, UNSPECIFIED PART OF COLON, UNSPECIFIED TYPE: ICD-10-CM

## 2022-09-02 DIAGNOSIS — G89.29 HIP PAIN, CHRONIC, RIGHT: Primary | ICD-10-CM

## 2022-09-02 DIAGNOSIS — Z00.00 ANNUAL PHYSICAL EXAM: ICD-10-CM

## 2022-09-02 DIAGNOSIS — L72.9 CYST OF SKIN: ICD-10-CM

## 2022-09-02 DIAGNOSIS — E55.9 VITAMIN D DEFICIENCY: ICD-10-CM

## 2022-09-02 DIAGNOSIS — M25.551 HIP PAIN, CHRONIC, RIGHT: ICD-10-CM

## 2022-09-02 PROCEDURE — 3725F SCREEN DEPRESSION PERFORMED: CPT | Performed by: PHYSICIAN ASSISTANT

## 2022-09-02 PROCEDURE — 73502 X-RAY EXAM HIP UNI 2-3 VIEWS: CPT

## 2022-09-02 PROCEDURE — 99214 OFFICE O/P EST MOD 30 MIN: CPT | Performed by: PHYSICIAN ASSISTANT

## 2022-09-02 NOTE — PROGRESS NOTES
Assessment/Plan:    1  Hip pain, chronic, right    - probable OA, c/w celebrex as needed, XR to confirm OA and severity, start PT  - XR hip/pelv 2-3 vws right if performed; Future  - Ambulatory Referral to Physical Therapy; Future    2  Vitamin D deficiency    - check level now  - Vitamin D 25 hydroxy; Future    3  Polyp of colon, unspecified part of colon, unspecified type    - history of, due for colonoscopy  - Ambulatory referral for colonoscopy; Future    4  Recurrent infections    - covid x 3 despite being vaccinated, mild cases, discussed with patient at length today    5  Cyst of skin    - benign cyst, left elbow, reassurance offered    6  Thyroid cyst    - benign in nature, no further work up required    F/u yearly or sooner if needed    Subjective:   Chief Complaint   Patient presents with    Physical Exam      Patient ID: Yrn Yusuf is a 59 y o  female  Patient here with a cyst on left elbow, showed derm and they advised cyst      Also with right hip pain for couple months  Off and on  Worse with prolong inactivity better with movement  Takes celebrex with relief  Main concern Is patient had covid for the third time  Mild covid  Tested prior to helping a friend  Only symptom was a runny nose after exposure and was positve        The following portions of the patient's history were reviewed and updated as appropriate: allergies, current medications, past family history, past medical history, past social history, past surgical history and problem list     Past Medical History:   Diagnosis Date    Endometriosis     Frozen shoulder     Pes anserine bursitis     Pityriasis rosea      Past Surgical History:   Procedure Laterality Date    BLADDER SURGERY      endometrial madd removed from bladder during BSO     SECTION      HYSTERECTOMY      age 37   Antolin Peña KNEE ARTHROSCOPY      medical meniscus tear and arthritis scraping     MOHS SURGERY      chemosurgery    OOPHORECTOMY Bilateral age 37     Family History   Problem Relation Age of Onset    Heart defect Mother     Parkinsonism Mother     Heart disease Mother         cardiac disorder    Heart defect Father     Heart disease Father         cardiac disorder    Aneurysm Sister     Cerebral aneurysm Sister     Arthritis Brother     Heart defect Brother     Hypertension Brother     Stroke Brother         ischemic    Kidney cancer Brother     Osteoporosis Brother     Heart disease Brother     No Known Problems Maternal Grandmother     No Known Problems Maternal Grandfather     No Known Problems Paternal Grandmother     No Known Problems Paternal Grandfather     No Known Problems Daughter     No Known Problems Sister     No Known Problems Sister     No Known Problems Daughter     No Known Problems Daughter     No Known Problems Maternal Aunt     No Known Problems Maternal Aunt     No Known Problems Paternal Aunt      Social History     Socioeconomic History    Marital status: /Civil Union     Spouse name: Not on file    Number of children: Not on file    Years of education: Not on file    Highest education level: Not on file   Occupational History    Not on file   Tobacco Use    Smoking status: Never Smoker    Smokeless tobacco: Never Used   Vaping Use    Vaping Use: Never used   Substance and Sexual Activity    Alcohol use: Yes     Comment: social    Drug use: No    Sexual activity: Yes     Partners: Male     Birth control/protection: Female Sterilization   Other Topics Concern    Not on file   Social History Narrative    Always uses a seatbelt    Caffeine use    Has smoke detectors      Social Determinants of Health     Financial Resource Strain: Not on file   Food Insecurity: Not on file   Transportation Needs: Not on file   Physical Activity: Not on file   Stress: Not on file   Social Connections: Not on file   Intimate Partner Violence: Not At Risk    Fear of Current or Ex-Partner: No    Emotionally Abused: No    Physically Abused: No    Sexually Abused: No   Housing Stability: Not on file       Current Outpatient Medications:     estradiol (ESTRACE VAGINAL) 0 1 mg/g vaginal cream, Insert 1 g into the vagina 2 (two) times a week, Disp: 127 5 g, Rfl: 3    Review of Systems          Objective:    Vitals:    09/02/22 1103   BP: 118/74   BP Location: Left arm   Patient Position: Sitting   Cuff Size: Standard   Pulse: 59   Resp: 14   SpO2: 100%   Weight: 82 5 kg (181 lb 14 4 oz)   Height: 5' 5 5" (1 664 m)        Physical Exam  Constitutional:       Appearance: Normal appearance  She is normal weight  HENT:      Head: Normocephalic and atraumatic  Cardiovascular:      Rate and Rhythm: Normal rate and regular rhythm  Pulses: Normal pulses  Heart sounds: Normal heart sounds  Pulmonary:      Effort: Pulmonary effort is normal       Breath sounds: Normal breath sounds  Musculoskeletal:         General: Normal range of motion  Cervical back: Normal range of motion and neck supple  Lymphadenopathy:      Cervical: No cervical adenopathy  Skin:     General: Skin is warm  Comments: Left olecranon process region with pea size soft, freely movable cyst in epidermis   Neurological:      General: No focal deficit present  Mental Status: She is alert and oriented to person, place, and time  Psychiatric:         Mood and Affect: Mood normal          Behavior: Behavior normal          Thought Content:  Thought content normal          Judgment: Judgment normal

## 2022-09-08 LAB
25(OH)D3 SERPL-MCNC: 48 NG/ML (ref 30–100)
ALBUMIN SERPL-MCNC: 4.1 G/DL (ref 3.6–5.1)
ALBUMIN/GLOB SERPL: 1.8 (CALC) (ref 1–2.5)
ALP SERPL-CCNC: 40 U/L (ref 37–153)
ALT SERPL-CCNC: 21 U/L (ref 6–29)
AST SERPL-CCNC: 27 U/L (ref 10–35)
BASOPHILS # BLD AUTO: 30 CELLS/UL (ref 0–200)
BASOPHILS NFR BLD AUTO: 0.6 %
BILIRUB SERPL-MCNC: 0.9 MG/DL (ref 0.2–1.2)
BUN SERPL-MCNC: 14 MG/DL (ref 7–25)
BUN/CREAT SERPL: NORMAL (CALC) (ref 6–22)
CALCIUM SERPL-MCNC: 9 MG/DL (ref 8.6–10.4)
CHLORIDE SERPL-SCNC: 105 MMOL/L (ref 98–110)
CHOLEST SERPL-MCNC: 222 MG/DL
CHOLEST/HDLC SERPL: 3.3 (CALC)
CO2 SERPL-SCNC: 29 MMOL/L (ref 20–32)
CREAT SERPL-MCNC: 0.87 MG/DL (ref 0.5–1.05)
EOSINOPHIL # BLD AUTO: 150 CELLS/UL (ref 15–500)
EOSINOPHIL NFR BLD AUTO: 3 %
ERYTHROCYTE [DISTWIDTH] IN BLOOD BY AUTOMATED COUNT: 12.9 % (ref 11–15)
GFR/BSA.PRED SERPLBLD CYS-BASED-ARV: 75 ML/MIN/1.73M2
GLOBULIN SER CALC-MCNC: 2.3 G/DL (CALC) (ref 1.9–3.7)
GLUCOSE SERPL-MCNC: 84 MG/DL (ref 65–99)
HCT VFR BLD AUTO: 40.5 % (ref 35–45)
HDLC SERPL-MCNC: 68 MG/DL
HGB BLD-MCNC: 13.7 G/DL (ref 11.7–15.5)
LDLC SERPL CALC-MCNC: 137 MG/DL (CALC)
LYMPHOCYTES # BLD AUTO: 1435 CELLS/UL (ref 850–3900)
LYMPHOCYTES NFR BLD AUTO: 28.7 %
MCH RBC QN AUTO: 30.9 PG (ref 27–33)
MCHC RBC AUTO-ENTMCNC: 33.8 G/DL (ref 32–36)
MCV RBC AUTO: 91.4 FL (ref 80–100)
MONOCYTES # BLD AUTO: 360 CELLS/UL (ref 200–950)
MONOCYTES NFR BLD AUTO: 7.2 %
NEUTROPHILS # BLD AUTO: 3025 CELLS/UL (ref 1500–7800)
NEUTROPHILS NFR BLD AUTO: 60.5 %
NONHDLC SERPL-MCNC: 154 MG/DL (CALC)
PLATELET # BLD AUTO: 247 THOUSAND/UL (ref 140–400)
PMV BLD REES-ECKER: 9.9 FL (ref 7.5–12.5)
POTASSIUM SERPL-SCNC: 4.3 MMOL/L (ref 3.5–5.3)
PROT SERPL-MCNC: 6.4 G/DL (ref 6.1–8.1)
RBC # BLD AUTO: 4.43 MILLION/UL (ref 3.8–5.1)
SODIUM SERPL-SCNC: 139 MMOL/L (ref 135–146)
TRIGL SERPL-MCNC: 75 MG/DL
TSH SERPL-ACNC: 2.72 MIU/L (ref 0.4–4.5)
WBC # BLD AUTO: 5 THOUSAND/UL (ref 3.8–10.8)

## 2022-09-15 ENCOUNTER — EVALUATION (OUTPATIENT)
Dept: PHYSICAL THERAPY | Facility: CLINIC | Age: 62
End: 2022-09-15
Payer: COMMERCIAL

## 2022-09-15 DIAGNOSIS — M25.551 HIP PAIN, CHRONIC, RIGHT: Primary | ICD-10-CM

## 2022-09-15 DIAGNOSIS — G89.29 HIP PAIN, CHRONIC, RIGHT: Primary | ICD-10-CM

## 2022-09-15 PROCEDURE — 97110 THERAPEUTIC EXERCISES: CPT | Performed by: PHYSICAL THERAPIST

## 2022-09-15 PROCEDURE — 97161 PT EVAL LOW COMPLEX 20 MIN: CPT | Performed by: PHYSICAL THERAPIST

## 2022-09-15 NOTE — PROGRESS NOTES
PT Evaluation     Today's date: 9/15/2022  Patient name: Kaz Alves  : 1960  MRN: [id number]  Referring provider: Ramonita Mack PA-C  Dx:   Encounter Diagnosis     ICD-10-CM    1  Hip pain, chronic, right  M25 551 Ambulatory Referral to Physical Therapy    G89 29                   Assessment  Assessment details: Pt is 59yo female presenting with chronic hip pain that increases with stand from a prolonged sitting position  Pt has some discomfort with end range flexion, extension, and internal rotation but normal rom  Pt had increased pain with resisted hip abduction  Due to these impairments, pain seems to be consistent with tendinopathy of the TFL  Pt would benefit from PT services to improve pain and return to PLOF  Impairments: abnormal or restricted ROM, activity intolerance, impaired physical strength, lacks appropriate home exercise program and pain with function  Functional limitations: standing from sitting  Symptom irritability: lowUnderstanding of Dx/Px/POC: good   Prognosis: good    Goals  1  Pt will have painfree and WNL of hip rom on Rt  2  Pt will have painfree resisted hip abduction to allow improve single leg activities  3  Pt will report no pain when standing from a prolonged sitting position  4  Pt will be independent with HEP upon discharge  Plan  Plan details: Will continue 1-2xweek  Patient would benefit from: skilled physical therapy  Planned modality interventions: low level laser therapy  Planned therapy interventions: functional ROM exercises, therapeutic activities, therapeutic exercise, therapeutic training, stretching, strengthening, home exercise program, neuromuscular re-education, manual therapy and patient education  Frequency: 2x week  Duration in weeks: 6  Treatment plan discussed with: patient        Subjective Evaluation    History of Present Illness  Mechanism of injury: Pt reports hip pain going on for awhile   Pain is when she stands after sitting for a while  Pt reports moving is fine, no pain  Pain is located in the front of the hip and feels like a pinch when she goes to stand up  Worse after sitting for awhile  Quality of life: good    Pain  Current pain ratin  At best pain ratin  At worst pain rating: 3  Quality: sharp  Relieving factors: rest  Progression: no change    Exercise history: walking; kayaking; canoing      Diagnostic Tests  X-ray: normal (bursitis)  Patient Goals  Patient goals for therapy: decreased pain and return to sport/leisure activities          Objective     Palpation     Right   Tenderness of the TFL  Lumbar Screen  Lumbar range of motion within normal limits  Passive Range of Motion     Right Hip   Normal passive range of motion  Flexion: with pain  Internal rotation (90/90): with pain    Additional Passive Range of Motion Details  Pain with end range flexion and internal rotation    Strength/Myotome Testing     Right Hip   Planes of Motion   Flexion: 5  Extension: 5  Abduction: 3+    Additional Strength Details  Pain with hip abduction    Tests     Right Hip   Positive MELIDA                Precautions: none      Manuals 9/15            Hip Distraction FH            Hip joint mobs lateral and inf             Laser prn                          Neuro Re-Ed                                                                                                        Ther Ex             Hip flexor stretch standing HEP 3x30''            Clamshell HEP 3x10            SL Hip ABD HEP 3x10            Bridge with ball             Prone Hip Ext             Qped donkey kicks             Qped fire hydrants             Side stepping             Monster walks             Ther Activity                                       Gait Training                                       Modalities

## 2022-09-19 ENCOUNTER — OFFICE VISIT (OUTPATIENT)
Dept: PHYSICAL THERAPY | Facility: CLINIC | Age: 62
End: 2022-09-19
Payer: COMMERCIAL

## 2022-09-19 DIAGNOSIS — G89.29 HIP PAIN, CHRONIC, RIGHT: Primary | ICD-10-CM

## 2022-09-19 DIAGNOSIS — M25.551 HIP PAIN, CHRONIC, RIGHT: Primary | ICD-10-CM

## 2022-09-19 PROCEDURE — 97140 MANUAL THERAPY 1/> REGIONS: CPT | Performed by: PHYSICAL THERAPIST

## 2022-09-19 PROCEDURE — 97110 THERAPEUTIC EXERCISES: CPT | Performed by: PHYSICAL THERAPIST

## 2022-09-19 NOTE — PROGRESS NOTES
Daily Note     Today's date: 2022  Patient name: Rosaura Zaidi  : 1960  MRN: [id number]  Referring provider: Luisana Bergeron PA-C  Dx:   Encounter Diagnosis     ICD-10-CM    1  Hip pain, chronic, right  M25 551     G89 29                   Subjective: Pt reports hip is feeling a lot better, she had no pain following a 2 hour drive in the car  She is taking Celebrex for a week  Objective: See treatment diary below      Assessment: Pt tolerating manual joint mobilizations well  Lateral hip stabilizers fatigue quickly with exercises  Will continue to progress strengthening and HEP  Plan: Continue per plan of care        Precautions: none      Manuals 9/15 9/19           Hip Distraction FH FH           Hip joint mobs lateral and inf  FH           Laser prn                          Neuro Re-Ed                                                                                                        Ther Ex             Hip flexor stretch standing HEP 3x30''            Clamshell HEP 3x10 3x10           SL Hip ABD HEP 3x10 3x10           Bridge with ball  20x5''           Prone Hip Ext  2x10ea           Qped donkey kicks  10xea           Qped fire hydrants  10xea           Side stepping  3 laps red           Monster walks  3 laps red           Ther Activity                                       Gait Training                                       Modalities

## 2022-09-22 ENCOUNTER — OFFICE VISIT (OUTPATIENT)
Dept: PHYSICAL THERAPY | Facility: CLINIC | Age: 62
End: 2022-09-22
Payer: COMMERCIAL

## 2022-09-22 DIAGNOSIS — G89.29 HIP PAIN, CHRONIC, RIGHT: Primary | ICD-10-CM

## 2022-09-22 DIAGNOSIS — M25.551 HIP PAIN, CHRONIC, RIGHT: Primary | ICD-10-CM

## 2022-09-22 PROCEDURE — 97140 MANUAL THERAPY 1/> REGIONS: CPT | Performed by: PHYSICAL THERAPIST

## 2022-09-22 PROCEDURE — 97110 THERAPEUTIC EXERCISES: CPT | Performed by: PHYSICAL THERAPIST

## 2022-09-22 NOTE — PROGRESS NOTES
Daily Note     Today's date: 2022  Patient name: Zelda Wilson  : 1960  MRN: [id number]  Referring provider: Jerry Schneider PA-C  Dx:   Encounter Diagnosis     ICD-10-CM    1  Hip pain, chronic, right  M25 551     G89 29                   Subjective: Pt reports continued improvement overall  Objective: See treatment diary below      Assessment: Pt tolerating exercises well, fire hydrants abd/er movement together is challenging  Added butterfly stretch for improved passive rom as well  Continue to progress as able  Plan: Continue per plan of care        Precautions: none      Manuals 9/15 9/19 9/22          Hip Distraction FH FH FH          Hip joint mobs lateral and inf  FH FH          Laser prn                          Neuro Re-Ed                                                                                                        Ther Ex             Bike   5' Add res         Hip flexor stretch standing HEP 3x30''            Clamshell HEP 3x10 3x10 3x10 red          SL Hip ABD HEP 3x10 3x10 3x10          Bridge with ball  20x5'' 20x5''          Prone Hip Ext  2x10ea 2x10ea          Qped donkey kicks  10xea 2x10 ea          Qped fire hydrants  10xea 2x10 ea          Butterfly stretch   5x30''          Side stepping  3 laps red 3 laps red          Monster walks  3 laps red 3 laps red          Ther Activity                                       Gait Training                                       Modalities

## 2022-09-28 ENCOUNTER — OFFICE VISIT (OUTPATIENT)
Dept: PHYSICAL THERAPY | Facility: CLINIC | Age: 62
End: 2022-09-28
Payer: COMMERCIAL

## 2022-09-28 DIAGNOSIS — G89.29 HIP PAIN, CHRONIC, RIGHT: Primary | ICD-10-CM

## 2022-09-28 DIAGNOSIS — M25.551 HIP PAIN, CHRONIC, RIGHT: Primary | ICD-10-CM

## 2022-09-28 PROCEDURE — 97140 MANUAL THERAPY 1/> REGIONS: CPT | Performed by: PHYSICAL THERAPIST

## 2022-09-28 PROCEDURE — 97110 THERAPEUTIC EXERCISES: CPT | Performed by: PHYSICAL THERAPIST

## 2022-09-28 NOTE — PROGRESS NOTES
Daily Note     Today's date: 2022  Patient name: Nicole Bryan  : 1960  MRN: [id number]  Referring provider: Issac Elizabeth PA-C  Dx:   Encounter Diagnosis     ICD-10-CM    1  Hip pain, chronic, right  M25 551     G89 29                   Subjective: Pt reports she had no pain after a 2 hours drive  She feels she is ready for discharge  Objective: See treatment diary below      Assessment: Pt tolerating all exercises well, no pain reported through treatment  Pt showing improved active rom for hip as well  Discharge PT services  Plan: Discharge PT services       Precautions: none      Manuals 9/15 9/19 9/22 9/28         Hip Distraction FH FH FH FH         Hip joint mobs lateral and inf  FH FH FH         Laser prn                          Neuro Re-Ed                                                                                                        Ther Ex             Bike   5' 5' res 4         Hip flexor stretch standing HEP 3x30''            Clamshell HEP 3x10 3x10 3x10 red          SL Hip ABD HEP 3x10 3x10 3x10 3x10         Bridge with ball  20x5'' 20x5'' 20x5''         Prone Hip Ext  2x10ea 2x10ea 2x10 ea         Qped donkey kicks  10xea 2x10 ea 2x10ea         Qped fire hydrants  10xea 2x10 ea 2x10 ea         Butterfly stretch   5x30'' 5x30''         Side stepping  3 laps red 3 laps red 3laps red         Monster walks  3 laps red 3 laps red 3laps red         Ther Activity                                       Gait Training                                       Modalities

## 2022-10-12 PROBLEM — L71.8 ROSACEA CONJUNCTIVITIS: Status: RESOLVED | Noted: 2021-01-25 | Resolved: 2022-10-12

## 2022-10-12 PROBLEM — H10.829 ROSACEA CONJUNCTIVITIS: Status: RESOLVED | Noted: 2021-01-25 | Resolved: 2022-10-12

## 2022-11-16 ENCOUNTER — ANNUAL EXAM (OUTPATIENT)
Dept: OBGYN CLINIC | Facility: MEDICAL CENTER | Age: 62
End: 2022-11-16

## 2022-11-16 VITALS
SYSTOLIC BLOOD PRESSURE: 130 MMHG | WEIGHT: 183 LBS | BODY MASS INDEX: 29.41 KG/M2 | HEIGHT: 66 IN | DIASTOLIC BLOOD PRESSURE: 78 MMHG

## 2022-11-16 DIAGNOSIS — Z12.11 SPECIAL SCREENING FOR MALIGNANT NEOPLASMS, COLON: ICD-10-CM

## 2022-11-16 DIAGNOSIS — N95.1 MENOPAUSAL SYMPTOMS: ICD-10-CM

## 2022-11-16 DIAGNOSIS — Z01.419 ENCOUNTER FOR GYNECOLOGICAL EXAMINATION (GENERAL) (ROUTINE) WITHOUT ABNORMAL FINDINGS: Primary | ICD-10-CM

## 2022-11-16 DIAGNOSIS — Z12.31 ENCOUNTER FOR SCREENING MAMMOGRAM FOR MALIGNANT NEOPLASM OF BREAST: ICD-10-CM

## 2022-11-16 RX ORDER — MULTIVITAMIN
1 TABLET ORAL DAILY
COMMUNITY

## 2022-11-16 RX ORDER — MULTIVITAMIN WITH IRON
1 TABLET ORAL DAILY
COMMUNITY

## 2022-11-16 RX ORDER — ESTRADIOL 0.1 MG/G
1 CREAM VAGINAL 2 TIMES WEEKLY
Qty: 127.5 G | Refills: 3 | Status: SHIPPED | OUTPATIENT
Start: 2022-11-17

## 2022-11-16 NOTE — PROGRESS NOTES
ASSESSMENT & PLAN: Devorah Aguilera was seen today for gynecologic exam     Diagnoses and all orders for this visit:    Encounter for gynecological examination (general) (routine) without abnormal findings    Encounter for screening mammogram for malignant neoplasm of breast  -     Mammo screening bilateral w 3d & cad; Future    Special screening for malignant neoplasms, colon  -     Ambulatory Referral to Gastroenterology; Future    Menopausal symptoms  -     estradiol (ESTRACE VAGINAL) 0 1 mg/g vaginal cream; Insert 1 g into the vagina 2 (two) times a week        1  Routine well woman exam done today  2  Pap and HPV:  The patient's pap is not applicable  Pap and cotesting was not done today  Current AS[facility]P Guidelines reviewed  3   Mammogram was ordered  4  Colorectal cancer screening is not up to date  Pt is aware and referral initiated  4  The following were reviewed in today's visit: adequate intake of calcium and vitamin D, exercise and healthy diet  5  F/u 1 year for next routine gyn exam   6  Refill of Estrace cream sent to patient's pharmacy  CC:  Annual Gynecologic Examination    HPI: Xiao Dobbs is a 57 y o   who presents for annual gynecologic examination  She has the following concerns:  No gyn issues  Health Maintenance:    Patient describes her health as excellent  Patient has weight concerns  She exercises 4-5 days per week with walking, gets 10K steps  She does wear her seatbelt routinely  She does not perform regular monthly self breast exams  She does feel safe at home  Patients does follow a blood type diet  Patient gets 2 servings of dairy or calcium rich foods a day      Last pap: n/a  Last mammogram:   Last colorectal cancer screenin, repeat de  Last DEXA:     Patient Active Problem List   Diagnosis   • Arthritis   • Cyst of left kidney   • Ocular rosacea   • Hemorrhoids   • Postmenopause atrophic vaginitis   • Squamous cell carcinoma of skin   • Vitamin D deficiency   • Health maintenance examination   • Benign cyst of left kidney   • Osteopenia of left hip   • Age-related cataract of both eyes   • Dry eyes   • Hereditary corneal dystrophy   • Insufficiency of tear film of both eyes   • Posterior vitreous detachment   • Squamous blepharitis       Past Medical History:   Diagnosis Date   • Endometriosis    • Frozen shoulder    • Pes anserine bursitis    • Pityriasis rosea        Past Surgical History:   Procedure Laterality Date   • BLADDER SURGERY      endometrial madd removed from bladder during BSO   •  SECTION     • HYSTERECTOMY      age 37   • KNEE ARTHROSCOPY      medical meniscus tear and arthritis scraping    • MOHS SURGERY      chemosurgery   • OOPHORECTOMY Bilateral     age 37       Past OB/Gyn History:    History of abnormal pap smears: No    Patient is currently sexually active  heterosexual  Patient's family planning method is status post hysterectomy and post menopausal status      Family History   Problem Relation Age of Onset   • Heart defect Mother    • Parkinsonism Mother    • Heart disease Mother         cardiac disorder   • Heart defect Father    • Heart disease Father         cardiac disorder   • Aneurysm Sister    • Cerebral aneurysm Sister    • Arthritis Brother    • Heart defect Brother    • Hypertension Brother    • Stroke Brother         ischemic   • Kidney cancer Brother    • Osteoporosis Brother    • Heart disease Brother    • No Known Problems Maternal Grandmother    • No Known Problems Maternal Grandfather    • No Known Problems Paternal Grandmother    • No Known Problems Paternal Grandfather    • No Known Problems Daughter    • No Known Problems Sister    • No Known Problems Sister    • No Known Problems Daughter    • No Known Problems Daughter    • No Known Problems Maternal Aunt    • No Known Problems Maternal Aunt    • No Known Problems Paternal Aunt        Social History:  Social History     Socioeconomic History • Marital status: /Civil Union     Spouse name: Not on file   • Number of children: Not on file   • Years of education: Not on file   • Highest education level: Not on file   Occupational History   • Not on file   Tobacco Use   • Smoking status: Never   • Smokeless tobacco: Never   Vaping Use   • Vaping Use: Never used   Substance and Sexual Activity   • Alcohol use: Yes     Comment: social   • Drug use: No   • Sexual activity: Yes     Partners: Male     Birth control/protection: Female Sterilization   Other Topics Concern   • Not on file   Social History Narrative    Always uses a seatbelt    Caffeine use    Has smoke detectors      Social Determinants of Health     Financial Resource Strain: Not on file   Food Insecurity: Not on file   Transportation Needs: Not on file   Physical Activity: Not on file   Stress: Not on file   Social Connections: Not on file   Intimate Partner Violence: Not At Risk   • Fear of Current or Ex-Partner: No   • Emotionally Abused: No   • Physically Abused: No   • Sexually Abused: No   Housing Stability: Not on file     Presently lives with spouse and daughter (2 other daughters out of the house)   Patient is currently retired  Allergies   Allergen Reactions   • Other Allergic Rhinitis         Current Outpatient Medications:   •  B Complex-C (B-complex with vitamin C) tablet, Take 1 tablet by mouth daily, Disp: , Rfl:   •  [START ON 11/17/2022] estradiol (ESTRACE VAGINAL) 0 1 mg/g vaginal cream, Insert 1 g into the vagina 2 (two) times a week, Disp: 127 5 g, Rfl: 3  •  Multiple Vitamin (multivitamin) tablet, Take 1 tablet by mouth daily, Disp: , Rfl:     Review of Systems  Constitutional :no fever, feels well, no tiredness, no recent weight gain or loss  ENT: no ear ache, no loss of hearing, no nosebleeds or nasal discharge, no sore throat or hoarseness    Cardiovascular: no complaints of slow or fast heart beat, no chest pain, no palpitations, no leg claudication or lower extremity edema  Respiratory: no complaints of shortness of shortness of breath, no MEZA  Breasts:no complaints of breast pain, breast lump, or nipple discharge  Gastrointestinal: no complaints of abdominal pain, constipation, nausea, vomiting, or diarrhea or bloody stools  Genitourinary : no complaints of dysuria, incontinence, pelvic pain, no dysmenorrhea, vaginal discharge or abnormal vaginal bleeding and as noted in HPI  Musculoskeletal: no complaints of arthralgia, no myalgia, no joint swelling or stiffness, no limb pain or swelling  Integumentary: no complaints of skin rash or lesion, itching or dry skin  Neurological: no complaints of headache, no confusion, no numbness or tingling, no dizziness or fainting    Physical Exam:     /78   Ht 5' 5 5" (1 664 m)   Wt 83 kg (183 lb)   LMP  (LMP Unknown)   BMI 29 99 kg/m²     General: appears stated age, cooperative, alert normal mood and affect   Psychiatric oriented to person, place and time  Mood and affect normal   Neck: normal, supple,trachea midline, no masses  Thyroid: normal, no thyromegaly   Heart: regular rate and rhythm, S1, S2 normal, no murmur, click, rub or gallop   Lungs: clear to auscultation bilaterally, no increased work of breathing or signs of respiratory distress   Breasts: normal, no dimpling or skin changes noted   Abdomen: soft, non-tender, without masses or organomegaly   Vulva: normal , no lesions   Vagina: normal , no lesions, mod atrophy   Urethra: normal   Urethal meatus normal   Bladder Normal, soft, non-tender and no prolapse or masses appreciated   Cervix: Surgically absent    Uterus: Surgically absent   Adnexa: normal, non-tender without fullness or masses   Lymphatic Palpation of lymph nodes in neck, axilla, groin and/or other locations: no lymphadenopathy or masses noted   Skin Normal skin turgor and no rashes    Palpation of skin and subcutaneous tissue normal

## 2023-03-09 ENCOUNTER — OFFICE VISIT (OUTPATIENT)
Dept: FAMILY MEDICINE CLINIC | Facility: CLINIC | Age: 63
End: 2023-03-09

## 2023-03-09 VITALS
DIASTOLIC BLOOD PRESSURE: 82 MMHG | WEIGHT: 185.1 LBS | TEMPERATURE: 98.3 F | BODY MASS INDEX: 30.33 KG/M2 | HEART RATE: 70 BPM | RESPIRATION RATE: 18 BRPM | OXYGEN SATURATION: 97 % | SYSTOLIC BLOOD PRESSURE: 122 MMHG

## 2023-03-09 DIAGNOSIS — R05.1 ACUTE COUGH: ICD-10-CM

## 2023-03-09 DIAGNOSIS — I49.9 IRREGULAR HEART BEAT: Primary | ICD-10-CM

## 2023-03-09 RX ORDER — FLUTICASONE PROPIONATE 50 MCG
1 SPRAY, SUSPENSION (ML) NASAL DAILY
Qty: 18.2 ML | Refills: 1
Start: 2023-03-09

## 2023-03-09 RX ORDER — METHYLPREDNISOLONE 4 MG/1
TABLET ORAL
COMMUNITY
Start: 2023-03-05

## 2023-03-09 RX ORDER — TOBRAMYCIN 3 MG/ML
SOLUTION/ DROPS OPHTHALMIC
COMMUNITY
Start: 2023-03-05

## 2023-03-09 RX ORDER — VARENICLINE 0.03 MG/.05ML
SPRAY NASAL
COMMUNITY
Start: 2023-03-04

## 2023-03-09 RX ORDER — AZITHROMYCIN 250 MG/1
TABLET, FILM COATED ORAL
COMMUNITY
Start: 2023-03-05

## 2023-03-09 RX ORDER — BENZONATATE 100 MG/1
100 CAPSULE ORAL 3 TIMES DAILY PRN
Qty: 20 CAPSULE | Refills: 0 | Status: SHIPPED | OUTPATIENT
Start: 2023-03-09

## 2023-03-09 NOTE — PROGRESS NOTES
Assessment/Plan:    1  Cough - resolving infection, finish prednisone as prescribed by [facility], Jodi Lipoma last today today should continue to work for another 5 days, will add tessalon perles to help suppress cough, fluids and rest encouraged    2  Irregular heart beat - EKG with sinus arrhythmia, possible PAC, will order holter and echo to be thorough, encouraged to reach out for changes    F/u as needed    Subjective: Follow up Urgent Care for sick visit     Patient ID: Ruby Viveros is a 57 y o  female  Patient here complaining of babysitting her grandchildren with flu, then  with flu, last week  Wednesday fever started 102 F for 3 days, then fever broke and had right eye with redness and yellow discharge, thick sinus congestion, left ear ache, coughing  Went to Urgent Care [facility], dx with conjunctivitis, sinusitis, bronchitis, left OM with significant fluid  Zpack, tobramycin, medrol dose pack  Finished zpack today, finishes medrol dose pack tomorrow  Chest burning gone  Still with tickle in throat  Scant mucus        The following portions of the patient's history were reviewed and updated as appropriate: allergies, current medications, past family history, past medical history, past social history, past surgical history and problem list     Past Medical History:   Diagnosis Date   • Endometriosis    • Frozen shoulder    • Pes anserine bursitis    • Pityriasis rosea      Past Surgical History:   Procedure Laterality Date   • BLADDER SURGERY      endometrial madd removed from bladder during BSO   •  SECTION     • HYSTERECTOMY      age 37   • KNEE ARTHROSCOPY      medical meniscus tear and arthritis scraping    • MOHS SURGERY      chemosurgery   • OOPHORECTOMY Bilateral     age 37     Family History   Problem Relation Age of Onset   • Heart defect Mother    • Parkinsonism Mother    • Heart disease Mother         cardiac disorder   • Heart defect Father    • Heart disease Father cardiac disorder   • Aneurysm Sister    • Cerebral aneurysm Sister    • Arthritis Brother    • Heart defect Brother    • Hypertension Brother    • Stroke Brother         ischemic   • Kidney cancer Brother    • Osteoporosis Brother    • Heart disease Brother    • No Known Problems Maternal Grandmother    • No Known Problems Maternal Grandfather    • No Known Problems Paternal Grandmother    • No Known Problems Paternal Grandfather    • No Known Problems Daughter    • No Known Problems Sister    • No Known Problems Sister    • No Known Problems Daughter    • No Known Problems Daughter    • No Known Problems Maternal Aunt    • No Known Problems Maternal Aunt    • No Known Problems Paternal Aunt      Social History     Socioeconomic History   • Marital status: /Civil Union     Spouse name: Not on file   • Number of children: Not on file   • Years of education: Not on file   • Highest education level: Not on file   Occupational History   • Not on file   Tobacco Use   • Smoking status: Never   • Smokeless tobacco: Never   Vaping Use   • Vaping Use: Never used   Substance and Sexual Activity   • Alcohol use: Yes     Comment: social   • Drug use: No   • Sexual activity: Yes     Partners: Male     Birth control/protection: Female Sterilization   Other Topics Concern   • Not on file   Social History Narrative    Always uses a seatbelt    Caffeine use    Has smoke detectors      Social Determinants of Health     Financial Resource Strain: Not on file   Food Insecurity: Not on file   Transportation Needs: Not on file   Physical Activity: Not on file   Stress: Not on file   Social Connections: Not on file   Intimate Partner Violence: Not At Risk   • Fear of Current or Ex-Partner: No   • Emotionally Abused: No   • Physically Abused: No   • Sexually Abused: No   Housing Stability: Not on file       Current Outpatient Medications:   •  azithromycin (ZITHROMAX) 250 mg tablet, TAKE 2 TABLETS BY MOUTH TODAY, THEN TAKE 1 TABLET DAILY FOR 4 DAYS, Disp: , Rfl:   •  B Complex-C (B-complex with vitamin C) tablet, Take 1 tablet by mouth daily, Disp: , Rfl:   •  estradiol (ESTRACE VAGINAL) 0 1 mg/g vaginal cream, Insert 1 g into the vagina 2 (two) times a week, Disp: 127 5 g, Rfl: 3  •  methylPREDNISolone 4 MG tablet therapy pack, TAKE 6 TABLETS ON DAY 1 AS DIRECTED ON PACKAGE AND DECREASE BY 1 TAB EACH DAY FOR A TOTAL OF 6 DAYS, Disp: , Rfl:   •  Multiple Vitamin (multivitamin) tablet, Take 1 tablet by mouth daily, Disp: , Rfl:   •  tobramycin (TOBREX) 0 3 % SOLN, ADMINISTER 1-2 DROPS TO BOTH EYES EVERY 4 HOURS FOR 5 DAYS , Disp: , Rfl:   •  Tyrvaya 0 03 MG/ACT SOLN, , Disp: , Rfl:     Review of Systems          Objective:    Vitals:    03/09/23 0853   BP: 122/82   Pulse: 70   Resp: 18   Temp: 98 3 °F (36 8 °C)   TempSrc: Oral   SpO2: 97%   Weight: 84 kg (185 lb 1 6 oz)        Physical Exam  Constitutional:       Appearance: She is well-developed  She is ill-appearing  HENT:      Head: Normocephalic and atraumatic  Right Ear: Tympanic membrane, ear canal and external ear normal       Left Ear: Tympanic membrane, ear canal and external ear normal       Nose: Mucosal edema, congestion and rhinorrhea present  Mouth/Throat:      Mouth: Mucous membranes are moist       Pharynx: Oropharynx is clear  No oropharyngeal exudate or posterior oropharyngeal erythema  Eyes:      Conjunctiva/sclera: Conjunctivae normal    Cardiovascular:      Rate and Rhythm: Normal rate and regular rhythm  Pulses: Normal pulses  Heart sounds: Normal heart sounds  Pulmonary:      Effort: Pulmonary effort is normal       Breath sounds: Normal breath sounds  Musculoskeletal:      Cervical back: Normal range of motion and neck supple  Lymphadenopathy:      Cervical: No cervical adenopathy  Skin:     General: Skin is warm  Neurological:      General: No focal deficit present        Mental Status: She is alert and oriented to person, place, and time  Psychiatric:         Mood and Affect: Mood normal          Behavior: Behavior normal          Thought Content:  Thought content normal          Judgment: Judgment normal

## 2023-03-10 ENCOUNTER — TELEPHONE (OUTPATIENT)
Dept: FAMILY MEDICINE CLINIC | Facility: CLINIC | Age: 63
End: 2023-03-10

## 2023-03-11 DIAGNOSIS — I49.9 IRREGULAR HEART BEAT: Primary | ICD-10-CM

## 2023-03-21 ENCOUNTER — HOSPITAL ENCOUNTER (OUTPATIENT)
Dept: NON INVASIVE DIAGNOSTICS | Age: 63
Discharge: HOME | End: 2023-03-21

## 2023-03-21 VITALS
HEIGHT: 65 IN | BODY MASS INDEX: 30.82 KG/M2 | HEART RATE: 66 BPM | SYSTOLIC BLOOD PRESSURE: 132 MMHG | DIASTOLIC BLOOD PRESSURE: 68 MMHG | WEIGHT: 185 LBS

## 2023-03-21 DIAGNOSIS — I49.9 IRREGULAR HEART BEAT: ICD-10-CM

## 2023-03-21 LAB
AORTIC ROOT: 2.8 CM
APICAL FOUR CHAMBER EJECTION FRACTION: 74 %
ASCENDING AORTA: 3.2 CM
DOP CALC LVOT AREA: 3.14 CM2
DOP CALC LVOT DIAMETER: 2 CM
E WAVE DECELERATION TIME: 181 MS
FRACTIONAL SHORTENING: 40 % (ref 28–44)
INTERVENTRICULAR SEPTUM IN DIASTOLE (PARASTERNAL SHORT AXIS VIEW): 0.9 CM
INTERVENTRICULAR SEPTUM: 0.9 CM (ref 0.6–1.1)
LEFT ATRIUM AREA SYSTOLE SINGLE PLANE A4C: 15.9 CM2
LEFT ATRIUM SIZE: 3.6 CM
LEFT INTERNAL DIMENSION IN SYSTOLE: 2.5 CM (ref 2.1–4)
LEFT VENTRICULAR INTERNAL DIMENSION IN DIASTOLE: 4.2 CM (ref 3.5–6)
LEFT VENTRICULAR POSTERIOR WALL IN END DIASTOLE: 1 CM
LEFT VENTRICULAR STROKE VOLUME: 55 ML
LVSV (TEICH): 55 ML
MV E'TISSUE VEL-SEP: 11 CM/S
MV PEAK A VEL: 0.62 M/S
MV PEAK E VEL: 81 CM/S
MV STENOSIS PRESSURE HALF TIME: 52 MS
MV VALVE AREA P 1/2 METHOD: 4.23 CM2
RA PRESSURE ESTIMATED: 8 MMHG
RIGHT ATRIUM AREA SYSTOLE A4C: 14.9 CM2
RIGHT VENTRICLE ID DIMENSION: 3.7 CM
RV PSP: 35 MMHG
SL CV LV EF: 65
SL CV PED ECHO LEFT VENTRICLE DIASTOLIC VOLUME (MOD BIPLANE) 2D: 78 ML
SL CV PED ECHO LEFT VENTRICLE SYSTOLIC VOLUME (MOD BIPLANE) 2D: 23 ML
TR MAX PG: 27 MMHG
TR PEAK VELOCITY: 2.6 M/S
TRICUSPID ANNULAR PLANE SYSTOLIC EXCURSION: 2.4 CM
TRICUSPID VALVE PEAK REGURGITATION VELOCITY: 2.6 M/S

## 2023-03-24 ENCOUNTER — HOSPITAL ENCOUNTER (OUTPATIENT)
Dept: MAMMOGRAPHY | Facility: IMAGING CENTER | Age: 63
Discharge: HOME | End: 2023-03-24

## 2023-03-24 VITALS — WEIGHT: 185 LBS | HEIGHT: 65 IN | BODY MASS INDEX: 30.82 KG/M2

## 2023-03-24 DIAGNOSIS — Z12.31 ENCOUNTER FOR SCREENING MAMMOGRAM FOR MALIGNANT NEOPLASM OF BREAST: ICD-10-CM

## 2023-03-31 ENCOUNTER — TELEPHONE (OUTPATIENT)
Dept: FAMILY MEDICINE CLINIC | Facility: CLINIC | Age: 63
End: 2023-03-31

## 2023-04-04 ENCOUNTER — TELEPHONE (OUTPATIENT)
Dept: FAMILY MEDICINE CLINIC | Facility: CLINIC | Age: 63
End: 2023-04-04

## 2023-04-04 ENCOUNTER — APPOINTMENT (EMERGENCY)
Dept: RADIOLOGY | Facility: HOSPITAL | Age: 63
End: 2023-04-04

## 2023-04-04 ENCOUNTER — HOSPITAL ENCOUNTER (EMERGENCY)
Facility: HOSPITAL | Age: 63
Discharge: HOME | End: 2023-04-04
Attending: EMERGENCY MEDICINE

## 2023-04-04 VITALS
RESPIRATION RATE: 22 BRPM | TEMPERATURE: 99.5 F | DIASTOLIC BLOOD PRESSURE: 73 MMHG | HEART RATE: 82 BPM | SYSTOLIC BLOOD PRESSURE: 155 MMHG | OXYGEN SATURATION: 98 %

## 2023-04-04 DIAGNOSIS — I49.1 PREMATURE ATRIAL CONTRACTIONS: Primary | ICD-10-CM

## 2023-04-04 LAB
ALBUMIN SERPL BCP-MCNC: 4.1 G/DL (ref 3.5–5)
ALP SERPL-CCNC: 50 U/L (ref 34–104)
ALT SERPL W P-5'-P-CCNC: 21 U/L (ref 7–52)
ANION GAP SERPL CALCULATED.3IONS-SCNC: 8 MMOL/L (ref 4–13)
APTT PPP: 26 SECONDS (ref 23–37)
AST SERPL W P-5'-P-CCNC: 25 U/L (ref 13–39)
BASOPHILS # BLD AUTO: 0.04 THOUSANDS/ÂΜL (ref 0–0.1)
BASOPHILS NFR BLD AUTO: 0 % (ref 0–1)
BILIRUB SERPL-MCNC: 0.92 MG/DL (ref 0.2–1)
BUN SERPL-MCNC: 12 MG/DL (ref 5–25)
CALCIUM SERPL-MCNC: 9.7 MG/DL (ref 8.4–10.2)
CARDIAC TROPONIN I PNL SERPL HS: 3 NG/L
CHLORIDE SERPL-SCNC: 105 MMOL/L (ref 96–108)
CO2 SERPL-SCNC: 26 MMOL/L (ref 21–32)
CREAT SERPL-MCNC: 0.83 MG/DL (ref 0.6–1.3)
D DIMER PPP FEU-MCNC: 0.36 UG/ML FEU
EOSINOPHIL # BLD AUTO: 0.04 THOUSAND/ÂΜL (ref 0–0.61)
EOSINOPHIL NFR BLD AUTO: 0 % (ref 0–6)
ERYTHROCYTE [DISTWIDTH] IN BLOOD BY AUTOMATED COUNT: 12.6 % (ref 11.6–15.1)
FLUAV RNA RESP QL NAA+PROBE: NEGATIVE
FLUBV RNA RESP QL NAA+PROBE: NEGATIVE
GFR SERPL CREATININE-BSD FRML MDRD: 74 ML/MIN/1.73SQ M
GLUCOSE SERPL-MCNC: 101 MG/DL (ref 65–140)
HCT VFR BLD AUTO: 40.4 % (ref 34.8–46.1)
HGB BLD-MCNC: 13.9 G/DL (ref 11.5–15.4)
IMM GRANULOCYTES # BLD AUTO: 0.03 THOUSAND/UL (ref 0–0.2)
IMM GRANULOCYTES NFR BLD AUTO: 0 % (ref 0–2)
INR PPP: 0.97 (ref 0.84–1.19)
LYMPHOCYTES # BLD AUTO: 1.36 THOUSANDS/ÂΜL (ref 0.6–4.47)
LYMPHOCYTES NFR BLD AUTO: 11 % (ref 14–44)
MCH RBC QN AUTO: 30.9 PG (ref 26.8–34.3)
MCHC RBC AUTO-ENTMCNC: 34.4 G/DL (ref 31.4–37.4)
MCV RBC AUTO: 90 FL (ref 82–98)
MONOCYTES # BLD AUTO: 0.95 THOUSAND/ÂΜL (ref 0.17–1.22)
MONOCYTES NFR BLD AUTO: 8 % (ref 4–12)
NEUTROPHILS # BLD AUTO: 10.26 THOUSANDS/ÂΜL (ref 1.85–7.62)
NEUTS SEG NFR BLD AUTO: 81 % (ref 43–75)
NRBC BLD AUTO-RTO: 0 /100 WBCS
PLATELET # BLD AUTO: 292 THOUSANDS/UL (ref 149–390)
PMV BLD AUTO: 9.1 FL (ref 8.9–12.7)
POTASSIUM SERPL-SCNC: 3.9 MMOL/L (ref 3.5–5.3)
PROT SERPL-MCNC: 7.6 G/DL (ref 6.4–8.4)
PROTHROMBIN TIME: 13.6 SECONDS (ref 11.6–14.5)
RBC # BLD AUTO: 4.5 MILLION/UL (ref 3.81–5.12)
RSV RNA RESP QL NAA+PROBE: NEGATIVE
SARS-COV-2 RNA RESP QL NAA+PROBE: NEGATIVE
SODIUM SERPL-SCNC: 139 MMOL/L (ref 135–147)
TSH SERPL DL<=0.05 MIU/L-ACNC: 1.62 UIU/ML (ref 0.45–4.5)
WBC # BLD AUTO: 12.68 THOUSAND/UL (ref 4.31–10.16)

## 2023-04-04 RX ORDER — METOPROLOL SUCCINATE 25 MG/1
25 TABLET, EXTENDED RELEASE ORAL DAILY
Qty: 20 TABLET | Refills: 0 | Status: SHIPPED | OUTPATIENT
Start: 2023-04-04

## 2023-04-04 NOTE — TELEPHONE ENCOUNTER
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

## 2023-04-04 NOTE — TELEPHONE ENCOUNTER
Patient calling again regarding her holter results  She had episodes of irregular heart beats last night and she said it kept her from sleeping  She said it was intermittent   She would like a call back       [phone] cell

## 2023-04-04 NOTE — ED PROVIDER NOTES
History  Chief Complaint   Patient presents with   • Rapid Heart Rate     Patient states shes was sick with a viral infection 4 weeks ago  Since then she been having a rapid/bounding heart rate since last night  You had an echo done outpatient 2 weeks ago and had a holter monitor with results pending  Denies lightheadedness, dizziness, SOB  43-year-old female presents with concern for fast and pounding heart rate  History from patient and medical records  She had sinus and chest congestion about 1 month ago  She was given a course of antibiotics and steroids  She is off of those meds now but since this episode she has had persistent symptoms of heart racing and pounding  She feels like its goes into her eyeballs and her ears  Prior to Admission Medications   Prescriptions Last Dose Informant Patient Reported? Taking? B Complex-C (B-complex with vitamin C) tablet   Yes No   Sig: Take 1 tablet by mouth daily   Multiple Vitamin (multivitamin) tablet   Yes No   Sig: Take 1 tablet by mouth daily   Tyrvaya 0 03 MG/ACT SOLN   Yes No   benzonatate (TESSALON PERLES) 100 mg capsule   No No   Sig: Take 1 capsule (100 mg total) by mouth 3 (three) times a day as needed for cough   estradiol (ESTRACE VAGINAL) 0 1 mg/g vaginal cream   No No   Sig: Insert 1 g into the vagina 2 (two) times a week   fluticasone (FLONASE) 50 mcg/act nasal spray   No No   Si spray into each nostril daily   tobramycin (TOBREX) 0 3 % SOLN   Yes No   Sig: ADMINISTER 1-2 DROPS TO BOTH EYES EVERY 4 HOURS FOR 5 DAYS        Facility-Administered Medications: None       Past Medical History:   Diagnosis Date   • Dry eye    • Endometriosis    • Frozen shoulder    • Pes anserine bursitis    • Pityriasis rosea    • Rosacea    • Skin cancer        Past Surgical History:   Procedure Laterality Date   • BLADDER SURGERY      endometrial madd removed from bladder during BSO   •  SECTION     • HYSTERECTOMY      age 37   • KNEE ARTHROSCOPY      medical meniscus tear and arthritis scraping    • MOHS SURGERY      chemosurgery   • OOPHORECTOMY Bilateral 2004    age 37       Family History   Problem Relation Age of Onset   • Heart defect Mother    • Parkinsonism Mother    • Heart disease Mother         cardiac disorder   • Heart defect Father    • Heart disease Father         cardiac disorder   • Aneurysm Sister    • Cerebral aneurysm Sister    • No Known Problems Sister    • No Known Problems Sister    • No Known Problems Daughter    • No Known Problems Daughter    • No Known Problems Daughter    • No Known Problems Maternal Grandmother    • No Known Problems Maternal Grandfather    • No Known Problems Paternal Grandmother    • No Known Problems Paternal Grandfather    • Arthritis Brother    • Heart defect Brother    • Hypertension Brother    • Stroke Brother         ischemic   • Kidney cancer Brother 62   • Osteoporosis Brother    • Heart disease Brother    • No Known Problems Maternal Aunt    • No Known Problems Maternal Aunt    • No Known Problems Paternal Aunt      I have reviewed and agree with the history as documented  E-Cigarette/Vaping   • E-Cigarette Use Never User      E-Cigarette/Vaping Substances     Social History     Tobacco Use   • Smoking status: Never   • Smokeless tobacco: Never   Vaping Use   • Vaping Use: Never used   Substance Use Topics   • Alcohol use: Yes     Comment: social   • Drug use: No       Review of Systems   Constitutional: Negative for chills and fever  HENT: Negative for rhinorrhea and sore throat  Respiratory: Negative for shortness of breath  Cardiovascular: Positive for palpitations  Negative for chest pain  Gastrointestinal: Negative for constipation, diarrhea, nausea and vomiting  Genitourinary: Negative for dysuria and frequency  Skin: Negative for rash  All other systems reviewed and are negative  Physical Exam  Physical Exam  Vitals and nursing note reviewed  Constitutional:       Appearance: She is well-developed  HENT:      Head: Normocephalic and atraumatic  Right Ear: External ear normal       Left Ear: External ear normal       Nose: Nose normal    Eyes:      Conjunctiva/sclera: Conjunctivae normal       Pupils: Pupils are equal, round, and reactive to light  Cardiovascular:      Rate and Rhythm: Normal rate and regular rhythm  Heart sounds: Normal heart sounds  Pulmonary:      Effort: Pulmonary effort is normal  No respiratory distress  Breath sounds: Normal breath sounds  No wheezing  Abdominal:      General: Bowel sounds are normal  There is no distension  Palpations: Abdomen is soft  Tenderness: There is no abdominal tenderness  Musculoskeletal:         General: No deformity  Normal range of motion  Cervical back: Normal range of motion and neck supple  No spinous process tenderness  Skin:     General: Skin is warm and dry  Findings: No rash  Neurological:      General: No focal deficit present  Mental Status: She is alert  GCS: GCS eye subscore is 4  GCS verbal subscore is 5  GCS motor subscore is 6  Sensory: No sensory deficit     Psychiatric:         Mood and Affect: Mood normal          Vital Signs  ED Triage Vitals [04/04/23 1317]   Temperature Pulse Respirations Blood Pressure SpO2   99 5 °F (37 5 °C) 103 18 (!) 199/91 98 %      Temp Source Heart Rate Source Patient Position - Orthostatic VS BP Location FiO2 (%)   Oral Monitor Sitting Right arm --      Pain Score       No Pain           Vitals:    04/04/23 1317 04/04/23 1400 04/04/23 1500 04/04/23 1530   BP: (!) 199/91 163/79 157/77 155/73   Pulse: 103 84 80 82   Patient Position - Orthostatic VS: Sitting  Sitting Sitting         Visual Acuity      ED Medications  Medications - No data to display    Diagnostic Studies  Results Reviewed     Procedure Component Value Units Date/Time    FLU/RSV/COVID - if FLU/RSV clinically relevant [[id number]] (Normal) Collected: 04/04/23 1412    Lab Status: Final result Specimen: Nares from Nose Updated: 04/04/23 1458     SARS-CoV-2 Negative     INFLUENZA A PCR Negative     INFLUENZA B PCR Negative     RSV PCR Negative    Narrative:      FOR PEDIATRIC PATIENTS - copy/paste COVID Guidelines URL to browser: [email or web address]    SARS-CoV-2 assay is a Nucleic Acid Amplification assay intended for the  qualitative detection of nucleic acid from SARS-CoV-2 in nasopharyngeal  swabs  Results are for the presumptive identification of SARS-CoV-2 RNA  Positive results are indicative of infection with SARS-CoV-2, the virus  causing COVID-19, but do not rule out bacterial infection or co-infection  with other viruses  Laboratories within the [place] and its  territories are required to report all positive results to the appropriate  public health authorities  Negative results do not preclude SARS-CoV-2  infection and should not be used as the sole basis for treatment or other  patient management decisions  Negative results must be combined with  clinical observations, patient history, and epidemiological information  This test has not been FDA cleared or approved  This test has been authorized by FDA under an Emergency Use Authorization  (EUA)  This test is only authorized for the duration of time the  declaration that circumstances exist justifying the authorization of the  emergency use of an in vitro diagnostic tests for detection of SARS-CoV-2  virus and/or diagnosis of COVID-19 infection under section 564(b)(1) of  the Act, 21 U  S C  360FIX-7(G)(1), unless the authorization is terminated  or revoked sooner  The test has been validated but independent review by FDA  and CLIA is pending  Test performed using [employer] GeneXpert: This RT-PCR assay targets N2,  a region unique to SARS-CoV-2   A conserved region in the E-gene was chosen  for pan-Sarbecovirus detection which includes SARS-CoV-2  According to CMS-2020-01-R, this platform meets the definition of high-throughput technology  TSH, 3rd generation with Free T4 reflex [[id number]]  (Normal) Collected: 04/04/23 1324    Lab Status: Final result Specimen: Blood from Arm, Right Updated: 04/04/23 1434     TSH 3RD GENERATON 1 619 uIU/mL     D-dimer, quantitative [[id number]]  (Normal) Collected: 04/04/23 1324    Lab Status: Final result Specimen: Blood from Arm, Right Updated: 04/04/23 1419     D-Dimer, Quant 0 36 ug/ml FEU     Narrative: In the evaluation for possible pulmonary embolism, in the appropriate (Well's Score of 4 or less) patient, the age adjusted d-dimer cutoff for this patient can be calculated as:    Age x 0 01 (in ug/mL) for Age-adjusted D-dimer exclusion threshold for a patient over 50 years      APTT [[id number]]  (Normal) Collected: 04/04/23 1324    Lab Status: Final result Specimen: Blood from Arm, Right Updated: 04/04/23 1418     PTT 26 seconds     Protime-INR [[id number]]  (Normal) Collected: 04/04/23 1324    Lab Status: Final result Specimen: Blood from Arm, Right Updated: 04/04/23 1418     Protime 13 6 seconds      INR 0 97    HS Troponin 0hr (reflex protocol) [[id number]]  (Normal) Collected: 04/04/23 1324    Lab Status: Final result Specimen: Blood from Arm, Right Updated: 04/04/23 1406     hs TnI 0hr 3 ng/L     Comprehensive metabolic panel [[id number]] Collected: 04/04/23 1324    Lab Status: Final result Specimen: Blood from Arm, Right Updated: 04/04/23 1348     Sodium 139 mmol/L      Potassium 3 9 mmol/L      Chloride 105 mmol/L      CO2 26 mmol/L      ANION GAP 8 mmol/L      BUN 12 mg/dL      Creatinine 0 83 mg/dL      Glucose 101 mg/dL      Calcium 9 7 mg/dL      AST 25 U/L      ALT 21 U/L      Alkaline Phosphatase 50 U/L      Total Protein 7 6 g/dL      Albumin 4 1 g/dL      Total Bilirubin 0 92 mg/dL      eGFR 74 ml/min/1 73sq m     Narrative:      National Kidney Disease Foundation guidelines for Chronic Kidney Disease (CKD):   •  Stage 1 with normal or high GFR (GFR > 90 mL/min/1 73 square meters)  •  Stage 2 Mild CKD (GFR = 60-89 mL/min/1 73 square meters)  •  Stage 3A Moderate CKD (GFR = 45-59 mL/min/1 73 square meters)  •  Stage 3B Moderate CKD (GFR = 30-44 mL/min/1 73 square meters)  •  Stage 4 Severe CKD (GFR = 15-29 mL/min/1 73 square meters)  •  Stage 5 End Stage CKD (GFR <15 mL/min/1 73 square meters)  Note: GFR calculation is accurate only with a steady state creatinine    CBC and differential [[id number]]  (Abnormal) Collected: 04/04/23 1324    Lab Status: Final result Specimen: Blood from Arm, Right Updated: 04/04/23 1331     WBC 12 68 Thousand/uL      RBC 4 50 Million/uL      Hemoglobin 13 9 g/dL      Hematocrit 40 4 %      MCV 90 fL      MCH 30 9 pg      MCHC 34 4 g/dL      RDW 12 6 %      MPV 9 1 fL      Platelets 944 Thousands/uL      nRBC 0 /100 WBCs      Neutrophils Relative 81 %      Immat GRANS % 0 %      Lymphocytes Relative 11 %      Monocytes Relative 8 %      Eosinophils Relative 0 %      Basophils Relative 0 %      Neutrophils Absolute 10 26 Thousands/µL      Immature Grans Absolute 0 03 Thousand/uL      Lymphocytes Absolute 1 36 Thousands/µL      Monocytes Absolute 0 95 Thousand/µL      Eosinophils Absolute 0 04 Thousand/µL      Basophils Absolute 0 04 Thousands/µL                  XR chest 1 view portable   Final Result by Jaida Murillo MD (04/04 1410)      No acute cardiopulmonary disease                    Workstation performed: [id number]                    Procedures  ECG 12 Lead Documentation Only    Date/Time: 4/4/2023 3:57 PM  Performed by: Scott Marshall DO  Authorized by: Scott Marshall DO     Indications / Diagnosis:  Palpitations  ECG reviewed by me, the ED Provider: yes    Patient location:  ED  Previous ECG:     Previous ECG:  Unavailable    Comparison to cardiac monitor: Yes (monitor shows about 3-4 second run of pacs)    Interpretation: Interpretation: normal    Rate:     ECG rate:  99    ECG rate assessment: normal    Rhythm:     Rhythm: sinus rhythm    Ectopy:     Ectopy: none    QRS:     QRS axis:  Normal    QRS intervals:  Normal  Conduction:     Conduction: normal    ST segments:     ST segments:  Normal  T waves:     T waves: normal               ED Course  ED Course as of 04/04/23 2351   Tue Apr 04, 2023   1406 Patient states she drinks mug and a 1/2 coffee in morning and cup of decaf at night  No etoh  Recent trip to [place] as well  discussed episode of PACs and symptoms with cardiology Dr Nela Woo via tiger text - recommends starting toprol xl and follow-up outpatient - no further episodes of arrhythmia witnessed in ER while studies being completed  SBIRT 20yo+    Flowsheet Row Most Recent Value   SBIRT (25 yo +)    In order to provide better care to our patients, we are screening all of our patients for alcohol and drug use  Would it be okay to ask you these screening questions? Yes Filed at: 04/04/2023 1340   Initial Alcohol Screen: US AUDIT-C     1  How often do you have a drink containing alcohol? 0 Filed at: 04/04/2023 1340   2  How many drinks containing alcohol do you have on a typical day you are drinking? 0 Filed at: 04/04/2023 1340   3b  FEMALE Any Age, or MALE 65+: How often do you have 4 or more drinks on one occassion? 0 Filed at: 04/04/2023 1340   Audit-C Score 0 Filed at: 04/04/2023 1340   SUPRIYA: How many times in the past year have you    Used an illegal drug or used a prescription medication for non-medical reasons? Never Filed at: 04/04/2023 1340                    Medical Decision Making  Differential includes arrhythmia, ischemia, MI, PE    Amount and/or Complexity of Data Reviewed  Labs: ordered  Radiology: ordered  Risk  Prescription drug management            Disposition  Final diagnoses:   Premature atrial contractions     Time reflects when diagnosis was documented in both MDM as applicable and the Disposition within this note     Time User Action Codes Description Comment    4/4/2023  3:30 PM Nneka Polo Add [I49 1] Premature atrial contractions       ED Disposition     ED Disposition   Discharge    Condition   Stable    Date/Time   Tue Apr 4, 2023  3:29 PM    Comment   Arlen Grace discharge to home/self care                 Follow-up Information     Follow up With Specialties Details Why Contact Info Additional [facility] Schedule an appointment as soon as possible for a visit   [address] [facility] [address]  [address]  Cardiology [phone], [address] [place], [place], [facility]          Discharge Medication List as of 4/4/2023  3:37 PM      START taking these medications    Details   metoprolol succinate (TOPROL-XL) 25 mg 24 hr tablet Take 1 tablet (25 mg total) by mouth daily, Starting Tue 4/4/2023, Normal         CONTINUE these medications which have NOT CHANGED    Details   B Complex-C (B-complex with vitamin C) tablet Take 1 tablet by mouth daily, Historical Med      benzonatate (TESSALON PERLES) 100 mg capsule Take 1 capsule (100 mg total) by mouth 3 (three) times a day as needed for cough, Starting Thu 3/9/2023, Normal      estradiol (ESTRACE VAGINAL) 0 1 mg/g vaginal cream Insert 1 g into the vagina 2 (two) times a week, Starting Thu 11/17/2022, Normal      fluticasone (FLONASE) 50 mcg/act nasal spray 1 spray into each nostril daily, Starting Thu 3/9/2023, No Print      Multiple Vitamin (multivitamin) tablet Take 1 tablet by mouth daily, Historical Med      tobramycin (TOBREX) 0 3 % SOLN ADMINISTER 1-2 DROPS TO BOTH EYES EVERY 4 HOURS FOR 5 DAYS , Historical Med      Tyrvaya 0 03 MG/ACT SOLN Starting Sat 3/4/2023, Historical Med                 PDMP Review     None          ED Provider  Electronically Signed by           Jared Jiménez Raul Granados,   04/04/23 4133

## 2023-04-05 LAB
ATRIAL RATE: 99 BPM
P AXIS: 72 DEGREES
PR INTERVAL: 158 MS
QRS AXIS: -18 DEGREES
QRSD INTERVAL: 90 MS
QT INTERVAL: 330 MS
QTC INTERVAL: 423 MS
T WAVE AXIS: 63 DEGREES
VENTRICULAR RATE: 99 BPM

## 2023-10-20 ENCOUNTER — OFFICE VISIT (OUTPATIENT)
Dept: CARDIOLOGY CLINIC | Facility: CLINIC | Age: 63
End: 2023-10-20
Payer: MEDICARE

## 2023-10-20 VITALS
HEART RATE: 62 BPM | BODY MASS INDEX: 30.49 KG/M2 | WEIGHT: 183 LBS | SYSTOLIC BLOOD PRESSURE: 136 MMHG | DIASTOLIC BLOOD PRESSURE: 70 MMHG | HEIGHT: 65 IN | OXYGEN SATURATION: 97 %

## 2023-10-20 DIAGNOSIS — I47.19 PAROXYSMAL ATRIAL TACHYCARDIA: Primary | ICD-10-CM

## 2023-10-20 DIAGNOSIS — I47.19 ATRIAL TACHYCARDIA: ICD-10-CM

## 2023-10-20 DIAGNOSIS — I49.1 PREMATURE ATRIAL CONTRACTIONS: ICD-10-CM

## 2023-10-20 PROCEDURE — 99213 OFFICE O/P EST LOW 20 MIN: CPT | Performed by: INTERNAL MEDICINE

## 2023-10-20 RX ORDER — METOPROLOL SUCCINATE 25 MG/1
25 TABLET, EXTENDED RELEASE ORAL DAILY PRN
Qty: 30 TABLET
Start: 2023-10-20

## 2023-10-20 NOTE — PROGRESS NOTES
Cardiology Outpatient Follow-Up Note - Imer Atkinson 57 y.o. female MRN: [id number]      Assessment/Plan:  1. Paroxysmal atrial tachycardia  Symptoms have mostly resolved. Stop metoprolol XL. Can use as PRN. F/u with PCP    2. Premature atrial contractions  - metoprolol succinate (TOPROL-XL) 25 mg 24 hr tablet; Take 1 tablet (25 mg total) by mouth daily as needed (palpitations)  Dispense: 30 tablet    3. Atrial tachycardia  - metoprolol succinate (TOPROL-XL) 25 mg 24 hr tablet; Take 1 tablet (25 mg total) by mouth daily as needed (palpitations)  Dispense: 30 tablet      We will see Imer Atkinson back PRN    Subjective:     HPI: Imer Atkinson is a 57y.o. year old female with palpitations due to PACs and short runs of AT presenting for follow-up. She self- discontinued metoprolol. She last took a dose in August.  Palpitations occur rarely. She feels they are exacerbated by heat or dehydration. She feels better after drinking more fluids. Cardiac testing:      TTE 3/21/23 Interpretation Summary          Left Ventricle: Left ventricular cavity size is normal. Wall thickness is normal. The left ventricular ejection fraction is 65%. Systolic function is normal. Wall motion is normal. Diastolic function is normal.    Right Ventricle: Right ventricular cavity size is normal. Systolic function is normal.    Mitral Valve: There is mild regurgitation. Tricuspid Valve: There is mild regurgitation. No prior studies for comparison. Holter 3/21/23 IMPRESSION:  The patient was in Sinus rhythm throughout the duration of the study. The average heart rate was 71 bpm.   Occasional PACs  There were several short runs of nonsustained atrial tachycardia, the longest consisted of 15 beats  No sustained arrhythmias were noted. No symptoms reported.             EKG reviewed personally:   EKG on 4/4/2023 personally reviewed and independently reinterpreted--normal sinus rhythm, 90 bpm, normal axis, normal intervals. Delayed anterior R wave progression. Abnormal study. Relevant Laboratory Studies:  (Laboratory studies personally reviewed)  CMP 4/4/2023 reviewed--WNL  CBC 4/4/2023 reviewed--WBC 12.7 otherwise WNL  TSH 4/4/2023 reviewed--WNL      ROS:  Review of Systems:  Review of Systems    Objective:     Vitals:   Vitals:    10/20/23 0958   BP: 136/70   BP Location: Right arm   Patient Position: Sitting   Cuff Size: Standard   Pulse: 62   SpO2: 97%   Weight: 83 kg (183 lb)   Height: 5' 5" (1.651 m)    Body surface area is 1.9 meters squared. Wt Readings from Last 3 Encounters:   10/20/23 83 kg (183 lb)   04/17/23 83.9 kg (185 lb)   03/24/23 83.9 kg (185 lb)       Physical Exam:    General: Ivette Gordon is a well appearing female, in no acute distress, sitting comfortably  HEENT: moist mucous membranes, EOMI  Neck:  No JVD, supple, trachea midline  Cardiovascular: unremarkable S1/S2, regular rate and rhythm, no murmurs, rubs or gallops  Pulmonary: normal respiratory effort, CTAB  Abdomen: soft and nondistended  Extremities: No lower extremity edema. Warm and well perfused extremities. Neuro: no focal motor deficits, AAOx3 (person, place, time)  Psych: Normal mood and affect, cooperative        Medications (at the START of this encounter):   Outpatient Medications Prior to Visit   Medication Sig Dispense Refill    B Complex-C (B-complex with vitamin C) tablet Take 1 tablet by mouth daily      estradiol (ESTRACE VAGINAL) 0.1 mg/g vaginal cream Insert 1 g into the vagina 2 (two) times a week 127.5 g 3    metoprolol succinate (TOPROL-XL) 25 mg 24 hr tablet Take 1 tablet (25 mg total) by mouth daily 90 tablet 3    Multiple Vitamin (multivitamin) tablet Take 1 tablet by mouth daily      Tyrvaya 0.03 MG/ACT SOLN       benzonatate (TESSALON PERLES) 100 mg capsule Take 1 capsule (100 mg total) by mouth 3 (three) times a day as needed for cough 20 capsule 0    fluticasone (FLONASE) 50 mcg/act nasal spray 1 spray into each nostril daily 18.2 mL 1    tobramycin (TOBREX) 0.3 % SOLN ADMINISTER 1-2 DROPS TO BOTH EYES EVERY 4 HOURS FOR 5 DAYS. No facility-administered medications prior to visit. Time Spent:  Total time (face-to-face and non-face-to-face) spent on today's visit was 21  minutes. This includes preparation for the visits (i.e. reviewing test results), performance of a medically appropriate history and examination, and orders for medications, tests or other procedures. This time is exclusive of procedures performed and time spent teaching. This note was completed in part utilizing [employer] voice recognition software. Grammatical errors, random word insertion, spelling mistakes, occasional wrong word or "sound-alike" substitutions and incomplete sentences may be an occasional consequence of the system secondary to software limitations, ambient noise and hardware issues. At the time of dictation, efforts were made to edit, clarify and /or correct errors. Please read the chart carefully and recognize, using context, where substitutions have occurred. If you have any questions or concerns about the context, text or information contained within the body of this dictation, please contact myself, the provider, for further clarification.

## 2023-10-25 ENCOUNTER — OFFICE VISIT (OUTPATIENT)
Dept: FAMILY MEDICINE CLINIC | Facility: CLINIC | Age: 63
End: 2023-10-25
Payer: MEDICARE

## 2023-10-25 VITALS
OXYGEN SATURATION: 98 % | HEART RATE: 68 BPM | BODY MASS INDEX: 29.09 KG/M2 | WEIGHT: 181 LBS | DIASTOLIC BLOOD PRESSURE: 78 MMHG | RESPIRATION RATE: 16 BRPM | TEMPERATURE: 97.5 F | SYSTOLIC BLOOD PRESSURE: 118 MMHG | HEIGHT: 66 IN

## 2023-10-25 DIAGNOSIS — Z23 ENCOUNTER FOR IMMUNIZATION: ICD-10-CM

## 2023-10-25 DIAGNOSIS — N95.1 MENOPAUSAL SYMPTOMS: ICD-10-CM

## 2023-10-25 DIAGNOSIS — Z13.220 SCREENING, LIPID: ICD-10-CM

## 2023-10-25 DIAGNOSIS — E01.0 THYROMEGALY: ICD-10-CM

## 2023-10-25 DIAGNOSIS — Z00.00 WELCOME TO MEDICARE PREVENTIVE VISIT: Primary | ICD-10-CM

## 2023-10-25 PROBLEM — H35.363 DRUSEN OF MACULA OF BOTH EYES: Status: ACTIVE | Noted: 2023-06-01

## 2023-10-25 PROCEDURE — G0402 INITIAL PREVENTIVE EXAM: HCPCS | Performed by: PHYSICIAN ASSISTANT

## 2023-10-25 RX ORDER — ESTRADIOL 0.1 MG/G
1 CREAM VAGINAL 2 TIMES WEEKLY
Qty: 127.5 G | Refills: 3 | Status: SHIPPED | OUTPATIENT
Start: 2023-10-26 | End: 2023-10-25 | Stop reason: SDUPTHER

## 2023-10-25 RX ORDER — ESTRADIOL 0.1 MG/G
1 CREAM VAGINAL 2 TIMES WEEKLY
Qty: 42.5 G | Refills: 3 | Status: SHIPPED | OUTPATIENT
Start: 2023-10-26

## 2023-10-25 NOTE — PROGRESS NOTES
Assessment and Plan:     AWV - conducted    2. Paroxysmal atrial tachycardia - resolved, under care cardiology, metoprolol prn    3. Thyroid nodule - enlarging per patient, US ordered, TSH  done 4/2023    4. Squamous cell skin cancer - under care derm    5. Cyst of kidney - stable 6.1 cm cyst, no follow up required    Prevnar needed but out today, will call to schedule for later    F/u 1 year as needed      Depression Screening and Follow-up Plan: Patient was screened for depression during today's encounter. They screened negative with a PHQ-2 score of 0. Preventive health issues were discussed with patient, and age appropriate screening tests were ordered as noted in patient's After Visit Summary. Personalized health advice and appropriate referrals for health education or preventive services given if needed, as noted in patient's After Visit Summary. History of Present Illness:     Patient presents for a Medicare Wellness Visit    HPI   Patient Care Team:  Jimenez Zaragoza PA-C as PCP - General (Family Medicine)  MD Jimenez Pryor PA-C     Review of Systems:     Review of Systems   Constitutional: Negative. HENT: Negative. Eyes: Negative. Respiratory: Negative. Cardiovascular: Negative. Gastrointestinal: Negative. Endocrine: Negative. Genitourinary: Negative. Musculoskeletal: Negative. Skin: Negative. Allergic/Immunologic: Negative. Neurological: Negative. Hematological: Negative. Psychiatric/Behavioral: Negative.           Problem List:     Patient Active Problem List   Diagnosis    Arthritis    Cyst of left kidney    Ocular rosacea    Hemorrhoids    Postmenopause atrophic vaginitis    Squamous cell carcinoma of skin    Vitamin D deficiency    Health maintenance examination    Benign cyst of left kidney    Osteopenia of left hip    Age-related cataract of both eyes    Dry eyes    Hereditary corneal dystrophy    Insufficiency of tear film of both eyes    Posterior vitreous detachment    Squamous blepharitis      Past Medical and Surgical History:     Past Medical History:   Diagnosis Date    Dry eye     Endometriosis     Frozen shoulder     Pes anserine bursitis     Pityriasis rosea     Rosacea     Skin cancer 2014     Past Surgical History:   Procedure Laterality Date    BLADDER SURGERY      endometrial madd removed from bladder during [address]  2004    age 37    KNEE ARTHROSCOPY      medical meniscus tear and arthritis scraping     MOHS SURGERY      chemosurgery    OOPHORECTOMY Bilateral 2004    age 37      Family History:     Family History   Problem Relation Age of Onset    Heart defect Mother     Parkinsonism Mother     Heart disease Mother         cardiac disorder    Heart defect Father     Heart disease Father         cardiac disorder    Aneurysm Sister     Cerebral aneurysm Sister     No Known Problems Sister     No Known Problems Sister     No Known Problems Daughter     No Known Problems Daughter     No Known Problems Daughter     No Known Problems Maternal Grandmother     No Known Problems Maternal Grandfather     No Known Problems Paternal Grandmother     No Known Problems Paternal Grandfather     Arthritis Brother     Heart defect Brother     Hypertension Brother     Stroke Brother         ischemic    Kidney cancer Brother 62    Osteoporosis Brother     Heart disease Brother     No Known Problems Maternal Aunt     No Known Problems Maternal Aunt     No Known Problems Paternal Aunt       Social History:     Social History     Socioeconomic History    Marital status: /Civil Union     Spouse name: None    Number of children: None    Years of education: None    Highest education level: None   Occupational History    None   Tobacco Use    Smoking status: Never    Smokeless tobacco: Never   Vaping Use    Vaping Use: Never used   Substance and Sexual Activity    Alcohol use: Yes     Comment: social Drug use: No    Sexual activity: Yes     Partners: Male     Birth control/protection: Female Sterilization   Other Topics Concern    None   Social History Narrative    Always uses a seatbelt    Caffeine use    Has smoke detectors      Social Determinants of Health     Financial Resource Strain: Low Risk  (10/20/2023)    Overall Financial Resource Strain (CARDIA)     Difficulty of Paying Living Expenses: Not hard at all   Food Insecurity: Not on file   Transportation Needs: No Transportation Needs (10/20/2023)    PRAPARE - Transportation     Lack of Transportation (Medical): No     Lack of Transportation (Non-Medical): No   Physical Activity: Not on file   Stress: Not on file   Social Connections: Not on file   Intimate Partner Violence: Not At Risk (9/2/2022)    Humiliation, Afraid, Rape, and Kick questionnaire     Fear of Current or Ex-Partner: No     Emotionally Abused: No     Physically Abused: No     Sexually Abused: No   Housing Stability: Not on file      Medications and Allergies:     Current Outpatient Medications   Medication Sig Dispense Refill    B Complex-C (B-complex with vitamin C) tablet Take 1 tablet by mouth daily      estradiol (ESTRACE VAGINAL) 0.1 mg/g vaginal cream Insert 1 g into the vagina 2 (two) times a week 127.5 g 3    metoprolol succinate (TOPROL-XL) 25 mg 24 hr tablet Take 1 tablet (25 mg total) by mouth daily as needed (palpitations) 30 tablet     Multiple Vitamin (multivitamin) tablet Take 1 tablet by mouth daily      Tyrvaya 0.03 MG/ACT SOLN        No current facility-administered medications for this visit.      Allergies   Allergen Reactions    Other Allergic Rhinitis      Immunizations:     Immunization History   Administered Date(s) Administered    COVID-19 PFIZER VACCINE 0.3 ML IM 04/25/2021, 05/19/2021    INFLUENZA 11/04/2017, 11/03/2018, 10/22/2021    Influenza, injectable, quadrivalent, preservative free 0.5 mL 11/01/2019, 10/07/2020    Tdap 06/20/2010, 04/27/2020    Zoster Vaccine Recombinant 05/23/2019, 09/24/2019    influenza, injectable, quadrivalent 09/25/2023      Health Maintenance:         Topic Date Due    HIV Screening  04/08/2024 (Originally 10/24/1975)    Hepatitis C Screening  05/08/2024 (Originally 1960)    Breast Cancer Screening: Mammogram  03/24/2024    Colorectal Cancer Screening  05/10/2028         Topic Date Due    COVID-19 Vaccine (3 - Pfizer series) 07/14/2021    Pneumococcal Vaccine: 65+ Years (1 - PCV) Never done      Medicare Screening Tests and Risk Assessments:         Health Risk Assessment:   Patient rates overall health as very good. Patient feels that their physical health rating is same. Patient is very satisfied with their life. Eyesight was rated as slightly worse. Hearing was rated as same. Patient feels that their emotional and mental health rating is same. Patients states they are never, rarely angry. Patient states they are never, rarely unusually tired/fatigued. Pain experienced in the last 7 days has been none. Patient states that she has experienced no weight loss or gain in last 6 months. Depression Screening:   PHQ-2 Score: 0      Fall Risk Screening: In the past year, patient has experienced: no history of falling in past year      Urinary Incontinence Screening:   Patient has not leaked urine accidently in the last six months. Home Safety:  Patient does not have trouble with stairs inside or outside of their home. Patient has working smoke alarms and has working carbon monoxide detector. Home safety hazards include: none. Nutrition:   Current diet is Regular. Medications:   Patient is currently taking over-the-counter supplements. OTC medications include: see medication list. Patient is able to manage medications.      Activities of Daily Living (ADLs)/Instrumental Activities of Daily Living (IADLs):   Walk and transfer into and out of bed and chair?: Yes  Dress and groom yourself?: Yes    Bathe or shower yourself?: Yes    Feed yourself? Yes  Do your laundry/housekeeping?: Yes  Manage your money, pay your bills and track your expenses?: Yes  Make your own meals?: Yes    Do your own shopping?: Yes    Previous Hospitalizations:   Any hospitalizations or ED visits within the last 12 months?: Yes    How many hospitalizations have you had in the last year?: 1-2    Hospitalization Comments: 1 ER visit for irregular heartbeat    Advance Care Planning:   Living will: Yes    Durable POA for healthcare: Yes    Advanced directive: Yes    End of Life Decisions reviewed with patient: Yes    Provider agrees with end of life decisions: Yes      Cognitive Screening:   Provider or family/friend/caregiver concerned regarding cognition?: No    PREVENTIVE SCREENINGS      Cardiovascular Screening:    General: Screening Current      Diabetes Screening:     General: Screening Current      Colorectal Cancer Screening:     General: Screening Current      Breast Cancer Screening:     General: Screening Current      Cervical Cancer Screening:    General: Screening Not Indicated      Osteoporosis Screening:    General: Screening Current      Abdominal Aortic Aneurysm (AAA) Screening:        General: Screening Not Indicated      Lung Cancer Screening:     General: Screening Not Indicated      Hepatitis C Screening:    General: Screening Current    Screening, Brief Intervention, and Referral to Treatment (SBIRT)    Screening  Typical number of drinks in a day: 0  Typical number of drinks in a week: 0  Interpretation: Low risk drinking behavior.     AUDIT-C Screenin) How often did you have a drink containing alcohol in the past year? monthly or less  2) How many drinks did you have on a typical day when you were drinking in the past year? 0  3) How often did you have 6 or more drinks on one occasion in the past year? never    AUDIT-C Score: 1  Interpretation: Score 0-2 (female): Negative screen for alcohol misuse    Single Item Drug Screening:  How often have you used an illegal drug (including marijuana) or a prescription medication for non-medical reasons in the past year? never    Single Item Drug Screen Score: 0  Interpretation: Negative screen for possible drug use disorder    Brief Intervention  Alcohol & drug use screenings were reviewed. No concerns regarding substance use disorder identified. Other Counseling Topics:   Car/seat belt/driving safety, skin self-exam, sunscreen and calcium and vitamin D intake and regular weightbearing exercise. Vision Screening    Right eye Left eye Both eyes   Without correction      With correction 20/25 20/25 20/15        Physical Exam:     Vitals:    10/25/23 1323   BP: 118/78   Pulse: 68   Resp: 16   Temp: 97.5 °F (36.4 °C)   TempSrc: Temporal   SpO2: 98%   Weight: 82.1 kg (181 lb)   Height: 5' 5.5" (1.664 m)        Physical Exam  Constitutional:       Appearance: Normal appearance. She is well-developed and normal weight. HENT:      Head: Normocephalic and atraumatic. Right Ear: Hearing normal.      Left Ear: Hearing normal.      Mouth/Throat:      Pharynx: Uvula midline. Eyes:      Extraocular Movements: Extraocular movements intact. Conjunctiva/sclera: Conjunctivae normal.      Pupils: Pupils are equal, round, and reactive to light. Neck:      Thyroid: No thyromegaly. Cardiovascular:      Rate and Rhythm: Normal rate and regular rhythm. Pulses: Normal pulses. Heart sounds: Normal heart sounds. No murmur heard. Pulmonary:      Effort: Pulmonary effort is normal.      Breath sounds: Normal breath sounds. Abdominal:      General: Abdomen is flat. Bowel sounds are normal. There is no distension. Palpations: Abdomen is soft. There is no mass. Tenderness: There is no abdominal tenderness. Musculoskeletal:         General: Normal range of motion. Cervical back: Normal range of motion and neck supple. Lymphadenopathy:      Cervical: No cervical adenopathy.    Skin: General: Skin is warm. Neurological:      General: No focal deficit present. Mental Status: She is alert and oriented to person, place, and time. Cranial Nerves: No cranial nerve deficit. Deep Tendon Reflexes: Reflexes normal.   Psychiatric:         Mood and Affect: Mood normal.         Behavior: Behavior normal.         Thought Content: Thought content normal.         Judgment: Judgment normal.          Salvatore Shaw PA-C    BMI Counseling: Body mass index is 29.66 kg/m². The BMI is above normal. Nutrition recommendations include reducing portion sizes.

## 2023-10-26 ENCOUNTER — APPOINTMENT (OUTPATIENT)
Dept: LAB | Facility: CLINIC | Age: 63
End: 2023-10-26
Payer: MEDICARE

## 2023-10-26 DIAGNOSIS — Z13.220 SCREENING, LIPID: ICD-10-CM

## 2023-10-26 LAB
CHOLEST SERPL-MCNC: 229 MG/DL
HDLC SERPL-MCNC: 75 MG/DL
LDLC SERPL CALC-MCNC: 143 MG/DL (ref 0–100)
TRIGL SERPL-MCNC: 56 MG/DL

## 2023-10-26 PROCEDURE — 36415 COLL VENOUS BLD VENIPUNCTURE: CPT

## 2023-10-26 PROCEDURE — 80061 LIPID PANEL: CPT

## 2023-11-03 ENCOUNTER — HOSPITAL ENCOUNTER (OUTPATIENT)
Dept: ULTRASOUND IMAGING | Facility: HOSPITAL | Age: 63
End: 2023-11-03
Payer: MEDICARE

## 2023-11-03 DIAGNOSIS — E01.0 THYROMEGALY: ICD-10-CM

## 2023-11-03 PROCEDURE — 76536 US EXAM OF HEAD AND NECK: CPT

## 2024-02-10 DIAGNOSIS — Z00.6 ENCOUNTER FOR EXAMINATION FOR NORMAL COMPARISON OR CONTROL IN CLINICAL RESEARCH PROGRAM: ICD-10-CM

## 2024-02-12 ENCOUNTER — APPOINTMENT (OUTPATIENT)
Dept: LAB | Facility: CLINIC | Age: 64
End: 2024-02-12

## 2024-02-12 DIAGNOSIS — Z00.6 ENCOUNTER FOR EXAMINATION FOR NORMAL COMPARISON OR CONTROL IN CLINICAL RESEARCH PROGRAM: ICD-10-CM

## 2024-02-12 PROCEDURE — 36415 COLL VENOUS BLD VENIPUNCTURE: CPT

## 2024-02-21 PROBLEM — Z00.00 HEALTH MAINTENANCE EXAMINATION: Status: RESOLVED | Noted: 2018-02-07 | Resolved: 2024-02-21

## 2024-03-10 LAB
APOB+LDLR+PCSK9 GENE MUT ANL BLD/T: NOT DETECTED
BRCA1+BRCA2 DEL+DUP + FULL MUT ANL BLD/T: NOT DETECTED
MLH1+MSH2+MSH6+PMS2 GN DEL+DUP+FUL M: NOT DETECTED

## 2024-04-30 DIAGNOSIS — Z12.31 ENCOUNTER FOR SCREENING MAMMOGRAM FOR MALIGNANT NEOPLASM OF BREAST: Primary | ICD-10-CM

## 2024-04-30 DIAGNOSIS — N95.1 MENOPAUSAL SYMPTOMS: ICD-10-CM

## 2024-05-01 RX ORDER — ESTRADIOL 0.1 MG/G
1 CREAM VAGINAL 2 TIMES WEEKLY
Qty: 42.5 G | Refills: 0 | Status: SHIPPED | OUTPATIENT
Start: 2024-05-02

## 2024-07-22 ENCOUNTER — HOSPITAL ENCOUNTER (OUTPATIENT)
Dept: MAMMOGRAPHY | Facility: IMAGING CENTER | Age: 64
Discharge: HOME | End: 2024-07-22
Payer: MEDICARE

## 2024-07-22 VITALS — HEIGHT: 66 IN | BODY MASS INDEX: 29.09 KG/M2 | WEIGHT: 181 LBS

## 2024-07-22 DIAGNOSIS — Z12.31 ENCOUNTER FOR SCREENING MAMMOGRAM FOR MALIGNANT NEOPLASM OF BREAST: ICD-10-CM

## 2024-07-22 PROCEDURE — 77067 SCR MAMMO BI INCL CAD: CPT

## 2024-07-22 PROCEDURE — 77063 BREAST TOMOSYNTHESIS BI: CPT

## 2024-08-26 ENCOUNTER — ANNUAL EXAM (OUTPATIENT)
Dept: OBGYN CLINIC | Facility: MEDICAL CENTER | Age: 64
End: 2024-08-26
Payer: MEDICARE

## 2024-08-26 VITALS
WEIGHT: 184.4 LBS | SYSTOLIC BLOOD PRESSURE: 138 MMHG | HEIGHT: 65 IN | BODY MASS INDEX: 30.72 KG/M2 | DIASTOLIC BLOOD PRESSURE: 84 MMHG

## 2024-08-26 DIAGNOSIS — Z12.31 ENCOUNTER FOR SCREENING MAMMOGRAM FOR MALIGNANT NEOPLASM OF BREAST: ICD-10-CM

## 2024-08-26 DIAGNOSIS — Z78.0 POSTMENOPAUSAL ESTROGEN DEFICIENCY: ICD-10-CM

## 2024-08-26 DIAGNOSIS — Z01.419 ENCNTR FOR GYN EXAM (GENERAL) (ROUTINE) W/O ABN FINDINGS: Primary | ICD-10-CM

## 2024-08-26 DIAGNOSIS — N95.1 MENOPAUSAL SYMPTOMS: ICD-10-CM

## 2024-08-26 PROCEDURE — G0101 CA SCREEN;PELVIC/BREAST EXAM: HCPCS | Performed by: OBSTETRICS & GYNECOLOGY

## 2024-08-26 RX ORDER — ESTRADIOL 0.1 MG/G
1 CREAM VAGINAL 2 TIMES WEEKLY
Qty: 42.5 G | Refills: 11 | Status: SHIPPED | OUTPATIENT
Start: 2024-08-26

## 2024-08-26 NOTE — PROGRESS NOTES
ASSESSMENT & PLAN: Vika was seen today for gynecologic exam.    Diagnoses and all orders for this visit:    Encntr for gyn exam (general) (routine) w/o abn findings    Menopausal symptoms  -     estradiol (ESTRACE VAGINAL) 0.1 mg/g vaginal cream; Insert 1 g into the vagina 2 (two) times a week    Encounter for screening mammogram for malignant neoplasm of breast  -     Mammo screening bilateral w 3d & cad; Future    Postmenopausal estrogen deficiency  -     DXA bone density spine hip and pelvis; Future      1.  Routine well woman exam done today.  2.  Pap and HPV:  Pap and cotesting was not done today.  Current ASCCP Guidelines reviewed.  3.  Mammogram was ordered.  4.  Colorectal cancer screening is up to date.    5.  DEXA is not up to date.  DEXA was ordered today.  6. The following were reviewed in today's visit: adequate intake of calcium and vitamin D, exercise, and healthy diet.  7.  F/u  1-2 years for next routine GYN exam.  8.  Refill of Estrace cream sent to patient's pharmacy.    CC:  Annual Gynecologic Examination    HPI: Vika Lauren is a 63 y.o. who presents for annual gynecologic examination.  She has the following concerns:  no c/o.   just had knee replacement.  Daughter just had twins.      Health Maintenance:    Patient describes her health as excellent.  Patient has weight concerns.  She exercises  4-5  days per week with walking, 10 K steps.    She does wear her seatbelt routinely.    She does not perform regular monthly self breast exams.    She does feel safe at home.   Patients does follow a healthy diet.  Patient gets 2 servings of dairy or calcium rich foods daily.      Last pap: n/a s/p hyster  Last mammogram:   Last colorectal cancer screenin colonoscopy  Last DEXA:     Patient Active Problem List   Diagnosis    Arthritis    Cyst of left kidney    Ocular rosacea    Hemorrhoids    Postmenopause atrophic vaginitis    Squamous cell carcinoma of skin    Vitamin D  deficiency    Benign cyst of left kidney    Osteopenia of left hip    Age-related cataract of both eyes    Dry eyes    Hereditary corneal dystrophy    Insufficiency of tear film of both eyes    Posterior vitreous detachment    Squamous blepharitis    Drusen of macula of both eyes       Past Medical History:   Diagnosis Date    BRCA1 negative     Dry eye     Endometriosis     Frozen shoulder     Pes anserine bursitis     Pityriasis rosea     Rosacea     Skin cancer        Past Surgical History:   Procedure Laterality Date    BLADDER SURGERY      endometrial madd removed from bladder during BSO     SECTION      HYSTERECTOMY      age 43    KNEE ARTHROSCOPY      medical meniscus tear and arthritis scraping     MOHS SURGERY      chemosurgery    OOPHORECTOMY Bilateral     age 43       Past OB/Gyn History:    Patient is currently sexually active.  heterosexual.    Family History   Problem Relation Age of Onset    Heart defect Mother     Parkinsonism Mother     Heart disease Mother         cardiac disorder    Heart defect Father     Heart disease Father         cardiac disorder    Aneurysm Sister     Cerebral aneurysm Sister     No Known Problems Sister     No Known Problems Sister     No Known Problems Daughter     No Known Problems Daughter     No Known Problems Daughter     No Known Problems Maternal Grandmother     No Known Problems Maternal Grandfather     No Known Problems Paternal Grandmother     No Known Problems Paternal Grandfather     Arthritis Brother     Heart defect Brother     Hypertension Brother     Stroke Brother         ischemic    Kidney cancer Brother 58    Osteoporosis Brother     Heart disease Brother     No Known Problems Maternal Aunt     No Known Problems Maternal Aunt     No Known Problems Paternal Aunt        Social History:   Social History     Socioeconomic History    Marital status: /Civil Union     Spouse name: Not on file    Number of children: Not on file    Years  of education: Not on file    Highest education level: Not on file   Occupational History    Not on file   Tobacco Use    Smoking status: Never    Smokeless tobacco: Never   Vaping Use    Vaping status: Never Used   Substance and Sexual Activity    Alcohol use: Yes     Comment: social    Drug use: No    Sexual activity: Yes     Partners: Male     Birth control/protection: Female Sterilization   Other Topics Concern    Not on file   Social History Narrative    Always uses a seatbelt    Caffeine use    Has smoke detectors      Social Determinants of Health     Financial Resource Strain: Low Risk  (10/20/2023)    Overall Financial Resource Strain (CARDIA)     Difficulty of Paying Living Expenses: Not hard at all   Food Insecurity: Not on file   Transportation Needs: No Transportation Needs (10/20/2023)    PRAPARE - Transportation     Lack of Transportation (Medical): No     Lack of Transportation (Non-Medical): No   Physical Activity: Not on file   Stress: Not on file   Social Connections: Not on file   Intimate Partner Violence: Not At Risk (9/2/2022)    Humiliation, Afraid, Rape, and Kick questionnaire     Fear of Current or Ex-Partner: No     Emotionally Abused: No     Physically Abused: No     Sexually Abused: No   Housing Stability: Not on file     Presently lives with .  Patient is currently retired.    Allergies   Allergen Reactions    Other Allergic Rhinitis         Current Outpatient Medications:     B Complex-C (B-complex with vitamin C) tablet, Take 1 tablet by mouth daily, Disp: , Rfl:     estradiol (ESTRACE VAGINAL) 0.1 mg/g vaginal cream, Insert 1 g into the vagina 2 (two) times a week, Disp: 42.5 g, Rfl: 11    metoprolol succinate (TOPROL-XL) 25 mg 24 hr tablet, Take 1 tablet (25 mg total) by mouth daily as needed (palpitations), Disp: 30 tablet, Rfl:     Multiple Vitamin (multivitamin) tablet, Take 1 tablet by mouth daily, Disp: , Rfl:     Tyrvaya 0.03 MG/ACT SOLN, , Disp: , Rfl:     Review of  "Systems  Constitutional :no fever, feels well, no tiredness, no recent weight gain or loss  ENT: no ear ache, no loss of hearing, no nosebleeds or nasal discharge, no sore throat or hoarseness.  Cardiovascular: no complaints of slow or fast heart beat, no chest pain, no palpitations, no leg claudication or lower extremity edema.  Respiratory: no complaints of shortness of shortness of breath, no MEZA  Breasts:no complaints of breast pain, breast lump, or nipple discharge  Gastrointestinal: no complaints of abdominal pain, constipation, nausea, vomiting, or diarrhea or bloody stools  Genitourinary : no complaints of dysuria, incontinence, pelvic pain, no dysmenorrhea, vaginal discharge or abnormal vaginal bleeding and as noted in HPI.  Musculoskeletal: no complaints of arthralgia, no myalgia, no joint swelling or stiffness, no limb pain or swelling.  Integumentary: no complaints of skin rash or lesion, itching or dry skin  Neurological: no complaints of headache, no confusion, no numbness or tingling, no dizziness or fainting     Physical Exam:   /84   Ht 5' 5\" (1.651 m)   Wt 83.6 kg (184 lb 6.4 oz)   LMP  (LMP Unknown)   BMI 30.69 kg/m²     General: appears stated age, cooperative, alert normal mood and affect   Psychiatric oriented to person, place and time.  Mood and affect normal   Neck: normal, supple,trachea midline, no masses.  Thyroid: More prominent on right   Heart: regular rate and rhythm, S1, S2 normal, no murmur, click, rub or gallop   Lungs: clear to auscultation bilaterally, no increased work of breathing or signs of respiratory distress   Breasts: normal, no dimpling or skin changes noted   Abdomen: soft, non-tender, without masses or organomegaly   Vulva: normal , no lesions   Vagina: normal , no lesions, mod atrophy   Urethra: normal   Urethal meatus normal   Bladder soft, non-tender and no prolapse or masses appreciated   Cervix: Surgically absent   Uterus: Surgically absent   Adnexa: " normal, non-tender without fullness or masses   Lymphatic Palpation of lymph nodes in neck, axilla, groin and/or other locations: no lymphadenopathy or masses noted   Skin Normal skin turgor and no rashes.  Palpation of skin and subcutaneous tissue normal.

## 2024-10-28 ENCOUNTER — OFFICE VISIT (OUTPATIENT)
Dept: FAMILY MEDICINE CLINIC | Facility: CLINIC | Age: 64
End: 2024-10-28
Payer: MEDICARE

## 2024-10-28 VITALS
DIASTOLIC BLOOD PRESSURE: 72 MMHG | RESPIRATION RATE: 16 BRPM | OXYGEN SATURATION: 98 % | HEART RATE: 70 BPM | WEIGHT: 186 LBS | TEMPERATURE: 98.4 F | HEIGHT: 66 IN | SYSTOLIC BLOOD PRESSURE: 120 MMHG | BODY MASS INDEX: 29.89 KG/M2

## 2024-10-28 DIAGNOSIS — I47.19 PAROXYSMAL ATRIAL TACHYCARDIA (HCC): ICD-10-CM

## 2024-10-28 DIAGNOSIS — Z13.1 SCREENING FOR DIABETES MELLITUS: ICD-10-CM

## 2024-10-28 DIAGNOSIS — Z00.00 WELCOME TO MEDICARE PREVENTIVE VISIT: Primary | ICD-10-CM

## 2024-10-28 DIAGNOSIS — E01.0 THYROMEGALY: ICD-10-CM

## 2024-10-28 DIAGNOSIS — Z13.220 SCREENING, LIPID: ICD-10-CM

## 2024-10-28 DIAGNOSIS — R53.83 FATIGUE, UNSPECIFIED TYPE: ICD-10-CM

## 2024-10-28 DIAGNOSIS — Z23 ENCOUNTER FOR IMMUNIZATION: ICD-10-CM

## 2024-10-28 PROCEDURE — 90677 PCV20 VACCINE IM: CPT

## 2024-10-28 PROCEDURE — G0009 ADMIN PNEUMOCOCCAL VACCINE: HCPCS

## 2024-10-28 PROCEDURE — G0438 PPPS, INITIAL VISIT: HCPCS | Performed by: PHYSICIAN ASSISTANT

## 2024-10-28 PROCEDURE — 99213 OFFICE O/P EST LOW 20 MIN: CPT | Performed by: PHYSICIAN ASSISTANT

## 2024-10-28 NOTE — PROGRESS NOTES
Ambulatory Visit  Name: Vika Lauren      : 1960      MRN: [id number]  Encounter Provider: Livia Quinones PA-C  Encounter Date: 10/28/2024   Encounter department: [facility]    Assessment & Plan  Welcome to Medicare preventive visit         Paroxysmal atrial tachycardia (HCC)    - on metoprolol, stable       Screening, lipid    Orders:    Lipid Panel with Direct LDL reflex; Future    Thyromegaly    - stable US, repeat 2024, TSH  Orders:    TSH, 3rd generation with Free T4 reflex; Future    US thyroid; Future    Screening for diabetes mellitus    Orders:    Comprehensive metabolic panel; Future    Fatigue, unspecified type    Orders:    CBC and differential; Future    Encounter for immunization    Orders:    Pneumococcal Conjugate Vaccine 20-valent (Pcv20)       Preventive health issues were discussed with patient, and age appropriate screening tests were ordered as noted in patient's After Visit Summary. Personalized health advice and appropriate referrals for health education or preventive services given if needed, as noted in patient's After Visit Summary.    History of Present Illness     HPI   Patient Care Team:  Livia Quinones PA-C as PCP - General (Family Medicine)  MD Livia Cabrera PA-C    Review of Systems   Constitutional: Negative.    HENT: Negative.     Eyes: Negative.    Respiratory: Negative.     Cardiovascular: Negative.    Gastrointestinal: Negative.    Endocrine: Negative.    Genitourinary: Negative.    Musculoskeletal: Negative.    Skin: Negative.    Allergic/Immunologic: Negative.    Neurological: Negative.    Hematological: Negative.    Psychiatric/Behavioral: Negative.       Medical History Reviewed by provider this encounter:       Annual Wellness Visit Questionnaire   Vika is here for her Subsequent Wellness visit.     Health Risk Assessment:   Patient rates overall health as very good. Patient  feels that their physical health rating is same. Patient is satisfied with their life. Eyesight was rated as same. Hearing was rated as same. Patient feels that their emotional and mental health rating is same. Patients states they are never, rarely angry. Patient states they are never, rarely unusually tired/fatigued. Pain experienced in the last 7 days has been none. Patient states that she has experienced no weight loss or gain in last 6 months.     Depression Screening:   PHQ-2 Score: 0      Fall Risk Screening:   In the past year, patient has experienced: no history of falling in past year      Urinary Incontinence Screening:   Patient has not leaked urine accidently in the last six months.     Home Safety:  Patient does not have trouble with stairs inside or outside of their home. Patient has working smoke alarms and has working carbon monoxide detector. Home safety hazards include: none.     Nutrition:   Current diet is Regular.     Medications:   Patient is currently taking over-the-counter supplements. OTC medications include: see medication list. Patient is able to manage medications.     Activities of Daily Living (ADLs)/Instrumental Activities of Daily Living (IADLs):   Walk and transfer into and out of bed and chair?: Yes  Dress and groom yourself?: Yes    Bathe or shower yourself?: Yes    Feed yourself? Yes  Do your laundry/housekeeping?: Yes  Manage your money, pay your bills and track your expenses?: Yes  Make your own meals?: Yes    Do your own shopping?: Yes    Durable Medical Equipment Suppliers  None    Previous Hospitalizations:   Any hospitalizations or ED visits within the last 12 months?: No      Advance Care Planning:   Living will: Yes    Durable POA for healthcare: Yes    Advanced directive: Yes    End of Life Decisions reviewed with patient: Yes    Provider agrees with end of life decisions: Yes      Cognitive Screening:   Provider or family/friend/caregiver concerned regarding  cognition?: No    PREVENTIVE SCREENINGS      Cardiovascular Screening:    General: Screening Current    Due for: Lipid Panel      Diabetes Screening:     General: Risks and Benefits Discussed    Due for: Blood Glucose      Colorectal Cancer Screening:     General: Screening Current      Breast Cancer Screening:     General: Screening Current      Cervical Cancer Screening:    General: Screening Not Indicated      Abdominal Aortic Aneurysm (AAA) Screening:        General: Screening Not Indicated      Lung Cancer Screening:     General: Screening Not Indicated      Hepatitis C Screening:    General: Screening Current    Screening, Brief Intervention, and Referral to Treatment (SBIRT)    Screening  Typical number of drinks in a day: 0  Typical number of drinks in a week: 1  Interpretation: Low risk drinking behavior.    AUDIT-C Screenin) How often did you have a drink containing alcohol in the past year? monthly or less  2) How many drinks did you have on a typical day when you were drinking in the past year? 1 to 2  3) How often did you have 6 or more drinks on one occasion in the past year? never    AUDIT-C Score: 1  Interpretation: Score 0-2 (female): Negative screen for alcohol misuse    Single Item Drug Screening:  How often have you used an illegal drug (including marijuana) or a prescription medication for non-medical reasons in the past year? never    Single Item Drug Screen Score: 0  Interpretation: Negative screen for possible drug use disorder    Brief Intervention  Alcohol & drug use screenings were reviewed. No concerns regarding substance use disorder identified.     Social Determinants of Health     Financial Resource Strain: Low Risk  (10/20/2023)    Overall Financial Resource Strain (CARDIA)     Difficulty of Paying Living Expenses: Not hard at all   Food Insecurity: No Food Insecurity (10/21/2024)    Hunger Vital Sign     Worried About Running Out of Food in the Last Year: Never true     Ran Out  "of Food in the Last Year: Never true   Transportation Needs: No Transportation Needs (10/21/2024)    PRAPARE - Transportation     Lack of Transportation (Medical): No     Lack of Transportation (Non-Medical): No   Housing Stability: Low Risk  (10/21/2024)    Housing Stability Vital Sign     Unable to Pay for Housing in the Last Year: No     Number of Times Moved in the Last Year: 0     Homeless in the Last Year: No   Utilities: Not At Risk (10/21/2024)    [facility]     Threatened with loss of utilities: No     No results found.    Objective     /72   Pulse 70   Temp 98.4 °F (36.9 °C) (Temporal)   Resp 16   Ht 5' 5.5\" (1.664 m)   Wt 84.4 kg (186 lb)   LMP  (LMP Unknown)   SpO2 98%   BMI 30.48 kg/m²     Physical Exam  Constitutional:       Appearance: Normal appearance. She is well-developed and normal weight.   HENT:      Head: Normocephalic and atraumatic.      Right Ear: Hearing and external ear normal.      Left Ear: Hearing and external ear normal.      Mouth/Throat:      Pharynx: Uvula midline.   Eyes:      Extraocular Movements: Extraocular movements intact.      Conjunctiva/sclera: Conjunctivae normal.      Pupils: Pupils are equal, round, and reactive to light.   Neck:      Thyroid: No thyromegaly.   Cardiovascular:      Rate and Rhythm: Normal rate and regular rhythm.      Pulses: Normal pulses.      Heart sounds: Normal heart sounds. No murmur heard.  Pulmonary:      Effort: Pulmonary effort is normal.      Breath sounds: Normal breath sounds.   Abdominal:      General: Abdomen is flat. Bowel sounds are normal. There is no distension.      Palpations: Abdomen is soft. There is no mass.      Tenderness: There is no abdominal tenderness.   Musculoskeletal:         General: Normal range of motion.      Cervical back: Normal range of motion and neck supple.   Lymphadenopathy:      Cervical: No cervical adenopathy.   Skin:     General: Skin is warm.   Neurological:      General: No focal " deficit present.      Mental Status: She is alert and oriented to person, place, and time.      Cranial Nerves: No cranial nerve deficit.      Deep Tendon Reflexes: Reflexes normal.   Psychiatric:         Mood and Affect: Mood normal.         Behavior: Behavior normal.         Thought Content: Thought content normal.         Judgment: Judgment normal.

## 2024-10-30 ENCOUNTER — APPOINTMENT (OUTPATIENT)
Dept: LAB | Facility: CLINIC | Age: 64
End: 2024-10-30
Payer: MEDICARE

## 2024-10-30 DIAGNOSIS — R53.83 FATIGUE, UNSPECIFIED TYPE: ICD-10-CM

## 2024-10-30 DIAGNOSIS — Z13.1 SCREENING FOR DIABETES MELLITUS: ICD-10-CM

## 2024-10-30 DIAGNOSIS — Z13.220 SCREENING, LIPID: ICD-10-CM

## 2024-10-30 DIAGNOSIS — E01.0 THYROMEGALY: ICD-10-CM

## 2024-10-30 LAB
ALBUMIN SERPL BCG-MCNC: 4.1 G/DL (ref 3.5–5)
ALP SERPL-CCNC: 46 U/L (ref 34–104)
ALT SERPL W P-5'-P-CCNC: 19 U/L (ref 7–52)
ANION GAP SERPL CALCULATED.3IONS-SCNC: 6 MMOL/L (ref 4–13)
AST SERPL W P-5'-P-CCNC: 24 U/L (ref 13–39)
BASOPHILS # BLD AUTO: 0.06 THOUSANDS/ΜL (ref 0–0.1)
BASOPHILS NFR BLD AUTO: 1 % (ref 0–1)
BILIRUB SERPL-MCNC: 0.6 MG/DL (ref 0.2–1)
BUN SERPL-MCNC: 17 MG/DL (ref 5–25)
CALCIUM SERPL-MCNC: 9.1 MG/DL (ref 8.4–10.2)
CHLORIDE SERPL-SCNC: 105 MMOL/L (ref 96–108)
CHOLEST SERPL-MCNC: 210 MG/DL
CO2 SERPL-SCNC: 29 MMOL/L (ref 21–32)
CREAT SERPL-MCNC: 0.77 MG/DL (ref 0.6–1.3)
EOSINOPHIL # BLD AUTO: 0.58 THOUSAND/ΜL (ref 0–0.61)
EOSINOPHIL NFR BLD AUTO: 9 % (ref 0–6)
ERYTHROCYTE [DISTWIDTH] IN BLOOD BY AUTOMATED COUNT: 12.7 % (ref 11.6–15.1)
GFR SERPL CREATININE-BSD FRML MDRD: 80 ML/MIN/1.73SQ M
GLUCOSE P FAST SERPL-MCNC: 81 MG/DL (ref 65–99)
HCT VFR BLD AUTO: 43.5 % (ref 34.8–46.1)
HDLC SERPL-MCNC: 69 MG/DL
HGB BLD-MCNC: 14.3 G/DL (ref 11.5–15.4)
IMM GRANULOCYTES # BLD AUTO: 0.01 THOUSAND/UL (ref 0–0.2)
IMM GRANULOCYTES NFR BLD AUTO: 0 % (ref 0–2)
LDLC SERPL CALC-MCNC: 126 MG/DL (ref 0–100)
LYMPHOCYTES # BLD AUTO: 1.8 THOUSANDS/ΜL (ref 0.6–4.47)
LYMPHOCYTES NFR BLD AUTO: 28 % (ref 14–44)
MCH RBC QN AUTO: 30.6 PG (ref 26.8–34.3)
MCHC RBC AUTO-ENTMCNC: 32.9 G/DL (ref 31.4–37.4)
MCV RBC AUTO: 93 FL (ref 82–98)
MONOCYTES # BLD AUTO: 0.42 THOUSAND/ΜL (ref 0.17–1.22)
MONOCYTES NFR BLD AUTO: 7 % (ref 4–12)
NEUTROPHILS # BLD AUTO: 3.55 THOUSANDS/ΜL (ref 1.85–7.62)
NEUTS SEG NFR BLD AUTO: 55 % (ref 43–75)
NRBC BLD AUTO-RTO: 0 /100 WBCS
PLATELET # BLD AUTO: 303 THOUSANDS/UL (ref 149–390)
PMV BLD AUTO: 10.2 FL (ref 8.9–12.7)
POTASSIUM SERPL-SCNC: 4.4 MMOL/L (ref 3.5–5.3)
PROT SERPL-MCNC: 6.5 G/DL (ref 6.4–8.4)
RBC # BLD AUTO: 4.67 MILLION/UL (ref 3.81–5.12)
SODIUM SERPL-SCNC: 140 MMOL/L (ref 135–147)
TRIGL SERPL-MCNC: 76 MG/DL
TSH SERPL DL<=0.05 MIU/L-ACNC: 3.8 UIU/ML (ref 0.45–4.5)
WBC # BLD AUTO: 6.42 THOUSAND/UL (ref 4.31–10.16)

## 2024-10-30 PROCEDURE — 85025 COMPLETE CBC W/AUTO DIFF WBC: CPT

## 2024-10-30 PROCEDURE — 84443 ASSAY THYROID STIM HORMONE: CPT

## 2024-10-30 PROCEDURE — 36415 COLL VENOUS BLD VENIPUNCTURE: CPT

## 2024-10-30 PROCEDURE — 80061 LIPID PANEL: CPT

## 2024-10-30 PROCEDURE — 80053 COMPREHEN METABOLIC PANEL: CPT

## 2024-11-14 ENCOUNTER — HOSPITAL ENCOUNTER (OUTPATIENT)
Dept: BONE DENSITY | Facility: IMAGING CENTER | Age: 64
Discharge: HOME | End: 2024-11-14
Payer: MEDICARE

## 2024-11-14 VITALS — BODY MASS INDEX: 30.96 KG/M2 | WEIGHT: 185.8 LBS | HEIGHT: 65 IN

## 2024-11-14 DIAGNOSIS — Z78.0 POSTMENOPAUSAL ESTROGEN DEFICIENCY: ICD-10-CM

## 2024-11-14 PROCEDURE — 77080 DXA BONE DENSITY AXIAL: CPT

## 2024-11-18 ENCOUNTER — RESULTS FOLLOW-UP (OUTPATIENT)
Dept: OBGYN CLINIC | Facility: MEDICAL CENTER | Age: 64
End: 2024-11-18

## 2024-11-19 ENCOUNTER — HOSPITAL ENCOUNTER (OUTPATIENT)
Dept: ULTRASOUND IMAGING | Facility: HOSPITAL | Age: 64
Discharge: HOME | End: 2024-11-19
Payer: MEDICARE

## 2024-11-19 DIAGNOSIS — E01.0 THYROMEGALY: ICD-10-CM

## 2024-11-19 PROCEDURE — 76536 US EXAM OF HEAD AND NECK: CPT

## 2024-11-26 ENCOUNTER — RESULTS FOLLOW-UP (OUTPATIENT)
Dept: FAMILY MEDICINE CLINIC | Facility: CLINIC | Age: 64
End: 2024-11-26

## 2025-01-22 ENCOUNTER — OFFICE VISIT (OUTPATIENT)
Dept: FAMILY MEDICINE CLINIC | Facility: CLINIC | Age: 65
End: 2025-01-22
Payer: MEDICARE

## 2025-01-22 VITALS
BODY MASS INDEX: 31.32 KG/M2 | RESPIRATION RATE: 16 BRPM | TEMPERATURE: 98.4 F | DIASTOLIC BLOOD PRESSURE: 72 MMHG | HEIGHT: 65 IN | OXYGEN SATURATION: 98 % | SYSTOLIC BLOOD PRESSURE: 130 MMHG | WEIGHT: 188 LBS | HEART RATE: 73 BPM

## 2025-01-22 DIAGNOSIS — I47.19 PAROXYSMAL ATRIAL TACHYCARDIA (HCC): ICD-10-CM

## 2025-01-22 DIAGNOSIS — M54.16 LUMBAR BACK PAIN WITH RADICULOPATHY AFFECTING LEFT LOWER EXTREMITY: Primary | ICD-10-CM

## 2025-01-22 PROCEDURE — 99213 OFFICE O/P EST LOW 20 MIN: CPT | Performed by: PHYSICIAN ASSISTANT

## 2025-01-22 PROCEDURE — G2211 COMPLEX E/M VISIT ADD ON: HCPCS | Performed by: PHYSICIAN ASSISTANT

## 2025-01-22 RX ORDER — PREDNISONE 10 MG/1
TABLET ORAL
Qty: 20 TABLET | Refills: 0 | Status: SHIPPED | OUTPATIENT
Start: 2025-01-22

## 2025-01-22 NOTE — PROGRESS NOTES
Assessment/Plan:    Low back pain with radiculopathy - prednisone taper as discussed, see PT. Consider MRI if no improvement    Paroxysmal atrial tachycardia - under care cardiology, metoprolol prn    F/u as needed and as scheduled    Subjective:   Chief Complaint   Patient presents with    Back Pain     Left lower back pain radiating down left leg  Started about a week ago        Patient ID: Vika Lauren is a 64 y.o. female.    10 days ago noted left low back started to hurt, figured it was from vacuuming or sitting on metal bleacher or shoveling. Got better for 3 days. Then Thursday started with a pain in hip and then got worse a few days later and started radiating down left leg to ankle. Has been tehre constant. Tried celebrex, hot soaks, stretches with minimal relief. Now feeling like she is developing a foot drop. Worse with laying or standing straight. Pain rates about a 3:10, at night 5:10.         The following portions of the patient's history were reviewed and updated as appropriate: allergies, current medications, past family history, past medical history, past social history, past surgical history, and problem list.    Past Medical History:   Diagnosis Date    BRCA1 negative     Dry eye     Endometriosis     Frozen shoulder     Pes anserine bursitis     Pityriasis rosea     Rosacea     Skin cancer      Past Surgical History:   Procedure Laterality Date    BLADDER SURGERY      endometrial madd removed from bladder during BSO     SECTION      HYSTERECTOMY      age 43    KNEE ARTHROSCOPY      medical meniscus tear and arthritis scraping     MOHS SURGERY      chemosurgery    OOPHORECTOMY Bilateral     age 43     Family History   Problem Relation Age of Onset    Heart defect Mother     Parkinsonism Mother     Heart disease Mother         cardiac disorder    Heart defect Father     Heart disease Father         cardiac disorder    Aneurysm Sister     Cerebral aneurysm Sister     No  Known Problems Sister     No Known Problems Sister     No Known Problems Daughter     No Known Problems Daughter     No Known Problems Daughter     No Known Problems Maternal Grandmother     No Known Problems Maternal Grandfather     No Known Problems Paternal Grandmother     No Known Problems Paternal Grandfather     Arthritis Brother     Heart defect Brother     Hypertension Brother     Stroke Brother         ischemic    Kidney cancer Brother 58    Osteoporosis Brother     Heart disease Brother     No Known Problems Maternal Aunt     No Known Problems Maternal Aunt     No Known Problems Paternal Aunt      Social History     Socioeconomic History    Marital status: /Civil Union     Spouse name: Not on file    Number of children: Not on file    Years of education: Not on file    Highest education level: Not on file   Occupational History    Not on file   Tobacco Use    Smoking status: Never    Smokeless tobacco: Never   Vaping Use    Vaping status: Never Used   Substance and Sexual Activity    Alcohol use: Yes     Comment: social    Drug use: No    Sexual activity: Yes     Partners: Male     Birth control/protection: Female Sterilization   Other Topics Concern    Not on file   Social History Narrative    Always uses a seatbelt    Caffeine use    Has smoke detectors      Social Drivers of Health     Financial Resource Strain: Low Risk  (10/20/2023)    Overall Financial Resource Strain (CARDIA)     Difficulty of Paying Living Expenses: Not hard at all   Food Insecurity: No Food Insecurity (10/21/2024)    Hunger Vital Sign     Worried About Running Out of Food in the Last Year: Never true     Ran Out of Food in the Last Year: Never true   Transportation Needs: No Transportation Needs (10/21/2024)    PRAPARE - Transportation     Lack of Transportation (Medical): No     Lack of Transportation (Non-Medical): No   Physical Activity: Not on file   Stress: Not on file   Social Connections: Not on file   Intimate  "Partner Violence: Not At Risk (9/2/2022)    Humiliation, Afraid, Rape, and Kick questionnaire     Fear of Current or Ex-Partner: No     Emotionally Abused: No     Physically Abused: No     Sexually Abused: No   Housing Stability: Low Risk  (10/21/2024)    Housing Stability Vital Sign     Unable to Pay for Housing in the Last Year: No     Number of Times Moved in the Last Year: 0     Homeless in the Last Year: No       Current Outpatient Medications:     B Complex-C (B-complex with vitamin C) tablet, Take 1 tablet by mouth daily, Disp: , Rfl:     estradiol (ESTRACE VAGINAL) 0.1 mg/g vaginal cream, Insert 1 g into the vagina 2 (two) times a week, Disp: 42.5 g, Rfl: 11    metoprolol succinate (TOPROL-XL) 25 mg 24 hr tablet, Take 1 tablet (25 mg total) by mouth daily as needed (palpitations), Disp: 30 tablet, Rfl:     Multiple Vitamin (multivitamin) tablet, Take 1 tablet by mouth daily, Disp: , Rfl:     Tyrvaya 0.03 MG/ACT SOLN, , Disp: , Rfl:     Review of Systems          Objective:    Vitals:    01/22/25 0955   BP: 130/72   Pulse: 73   Resp: 16   Temp: 98.4 °F (36.9 °C)   TempSrc: Temporal   SpO2: 98%   Weight: 85.3 kg (188 lb)   Height: 5' 5\" (1.651 m)        Physical Exam  Constitutional:       Appearance: Normal appearance. She is normal weight.   HENT:      Head: Normocephalic and atraumatic.   Musculoskeletal:         General: Normal range of motion.      Comments: Full ROM can touch toes   Skin:     General: Skin is warm.   Neurological:      General: No focal deficit present.      Mental Status: She is alert and oriented to person, place, and time.      Motor: No weakness.      Gait: Gait normal.      Deep Tendon Reflexes: Reflexes normal.      Comments: SLR positive on left felt down to ankle, heel/toe walk normal, DTR +2 AT= PT   Psychiatric:         Mood and Affect: Mood normal.         Behavior: Behavior normal.         Thought Content: Thought content normal.         Judgment: Judgment normal.       "

## 2025-01-23 ENCOUNTER — EVALUATION (OUTPATIENT)
Dept: PHYSICAL THERAPY | Facility: CLINIC | Age: 65
End: 2025-01-23
Payer: MEDICARE

## 2025-01-23 DIAGNOSIS — M54.16 LUMBAR BACK PAIN WITH RADICULOPATHY AFFECTING LEFT LOWER EXTREMITY: ICD-10-CM

## 2025-01-23 PROCEDURE — 97161 PT EVAL LOW COMPLEX 20 MIN: CPT | Performed by: PHYSICAL THERAPIST

## 2025-01-23 PROCEDURE — 97110 THERAPEUTIC EXERCISES: CPT | Performed by: PHYSICAL THERAPIST

## 2025-01-23 NOTE — PROGRESS NOTES
PT Evaluation     Today's date: 2025  Patient name: Vika Lauren  : 1960  MRN: [id number]  Referring provider: Livia Quinones P*  Dx:   Encounter Diagnosis     ICD-10-CM    1. Lumbar back pain with radiculopathy affecting left lower extremity  M54.16 Ambulatory Referral to Physical Therapy                     Assessment  Impairments: abnormal gait, abnormal or restricted ROM, activity intolerance, impaired balance, impaired physical strength, lacks appropriate home exercise program and poor posture   Symptom irritability: low    Assessment details: Vika Lauren is a 64 y.o. female who presents to physical therapy with diagnosis of Lumbar back pain with radiculopathy affecting left lower extremity. Vika presents with pain, abnormal posture, and limitations in range of motion, strength, joint mobility, flexibility, and functional ability. She had positive sciatic nerve tension in supine and reduced symptoms with repeated flexion. The current limitations are affecting Vika's ability to function at prior level. She will benefit from skilled physical therapy to address the current impairments and functional limitations to enable her to return to daily activities at maximal level. Thank you for the referral.    Understanding of Dx/Px/POC: good     Prognosis: good    Goals  STG  Patient will decrease pain at worst to 25%  Patient will demonstrate pain free lumbar AROM  Patient will improve LE strength 1/3 grade in all deficit planes  Patient will be independent with basic HEP    LTG  Patient will decrease pain at worst to 75%  Patient will improve LE strength to at least 4+ grade in all deficit planes  Patient will no longer demonstrate foot drop on LLE  Patient will be independent with comprehensive HEP    Plan  Patient would benefit from: skilled physical therapy  Planned modality interventions: cryotherapy and thermotherapy: hydrocollator packs    Planned therapy interventions:  "manual therapy, neuromuscular re-education, patient education, therapeutic activities, therapeutic exercise, therapeutic training and home exercise program    Frequency: 1-2x week  Duration in weeks: 6  Plan of Care beginning date: 2025  Plan of Care expiration date: 3/6/2025  Treatment plan discussed with: patient        Subjective Evaluation    History of Present Illness  Mechanism of injury: Vika is a 64 y.o. female presenting to physical therapy on 25 with primary complaints of low back pain that has gone into her left lower extremity. She mentions the pain is not as much in the low back as in the leg currently. It is a dull pain from her left hip to her foot. She also mentions that she started to notice a foot drop on the left foot. She saw the doctor who recommended prednisone and PT. She mentions she already notices improvements with starting prednisone yesterday. She is able to complete ADLs without limitations but does have to take extra time to complete household tasks. She has 3 steps to enter her home and she is able to complete in a reciprocal pattern. Standing is the most difficult. She is having sleep disturbances but since starting prednisone it has been better than previously.     Patient Goals  Patient goals for therapy: decreased pain, increased strength and return to sport/leisure activities  Patient goal: \"get rid of pain and improve foot drop\"  Pain  Current pain ratin  At best pain ratin  At worst pain ratin  Location: L low back / LE  Quality: dull ache  Relieving factors: medications, heat and rest  Aggravating factors: sitting (lying on left side)    Social Support  Steps to enter house: yes  Stairs in house: no     Employment status: not working    Diagnostic Tests  No diagnostic tests performed  Treatments  Previous treatment: medication        Objective    Posture: fair    Sensation: light touch intact bilaterally    Palpation: tenderness noted along left glute " med, piriformis, lower lumbar paraspinals    Lumbar AROM: WFL in all planes with some pain end range R lateral flexion    Lower Extremity Strength  Right   Hip Flexion: 5/5   Hip Extension: 5/5  Hip Abduction: 4+/5   Hip IR (90/90): 5/5  Hip ER (90/90): 5/5  Knee Extension: 5/5   Knee Flexion: 5/5   Ankle DF (KF): 5/5   Ankle DF (KE): 5/5  Ankle PF: 5/5  Ankle IV: 5/5  Ankle EV: 5/5    Left  Hip Flexion: 5/5   Hip Extension: 5/5  Hip Abduction: 4/5   Hip IR (90/90): 4+/5  Hip ER (90/90): 4+/5  Knee Extension: 5/5   Knee Flexion: 5/5   Ankle DF (KF): 2/5   Ankle DF (KE): 2+/5  Ankle PF: 5/5  Ankle IV: 5/5  Ankle EV: 4+/5      Special Tests: slump seated: negative. Sciatic nerve tension supine: positive, femoral nerve tension: negative      Repeated Motions:  Flexion: improved symptoms  Extension: no change         Precautions: standard      Manuals 1/23                                                                Neuro Re-Ed             TA Brace HEP                         SL Balance             Eccentric Dorsiflexion                                                    Ther Ex             SKTC HEP            Seated HR HEP            Seated TR HEP            Pball Roll Out 3 Way             Ankle TB 4 Way                                                    Ther Activity             Bike                          Gait Training                                       Modalities

## 2025-01-28 ENCOUNTER — OFFICE VISIT (OUTPATIENT)
Dept: PHYSICAL THERAPY | Facility: CLINIC | Age: 65
End: 2025-01-28
Payer: MEDICARE

## 2025-01-28 DIAGNOSIS — M54.16 LUMBAR BACK PAIN WITH RADICULOPATHY AFFECTING LEFT LOWER EXTREMITY: Primary | ICD-10-CM

## 2025-01-28 PROCEDURE — 97110 THERAPEUTIC EXERCISES: CPT

## 2025-01-28 NOTE — PROGRESS NOTES
"Daily Note     Today's date: 2025  Patient name: Vika Lauren  : 1960  MRN: [id number]  Referring provider: Livia Quinones P*  Dx:   Encounter Diagnosis     ICD-10-CM    1. Lumbar back pain with radiculopathy affecting left lower extremity  M54.16                      Subjective: Pt reports her LB feels a little better.  L LE radicular symptoms cont.       Objective: See treatment diary below      Assessment: Reviewed/performed HEP.Addition of RTB for DF and INV was challenging for pt, performed remaining new exercises w/o difficulty or discomfort. Tolerated treatment well. Will monitor. Patient would benefit from continued PT to decrease pain,and L LE radicular symptoms.      Plan: Continue per plan of care. Progress NV.     Precautions: standard      Manuals                                                                Neuro Re-Ed             TA Brace HEP 5\"x10                        SL Balance             Eccentric Dorsiflexion                                                    Ther Ex             SKTC HEP 10\"x10           Seated HR HEP 2x10           Seated TR HEP 2x10           Pball Roll Out 3 Way  Fwd 5\"x10           Ankle TB 4 Way  Red 10x ea                                                  Ther Activity             Bike  5'                        Gait Training                                       Modalities                                            "

## 2025-01-30 ENCOUNTER — OFFICE VISIT (OUTPATIENT)
Dept: PHYSICAL THERAPY | Facility: CLINIC | Age: 65
End: 2025-01-30
Payer: MEDICARE

## 2025-01-30 DIAGNOSIS — M54.16 LUMBAR BACK PAIN WITH RADICULOPATHY AFFECTING LEFT LOWER EXTREMITY: Primary | ICD-10-CM

## 2025-01-30 PROCEDURE — 97110 THERAPEUTIC EXERCISES: CPT | Performed by: PHYSICAL THERAPIST

## 2025-01-30 NOTE — PROGRESS NOTES
"Daily Note     Today's date: 2025  Patient name: Vika Lauren  : 1960  MRN: [id number]  Referring provider: Livia Quinones P*  Dx: No diagnosis found.               Subjective: ***      Objective: See treatment diary below      Assessment: Patient tolerated treatment session well.       Plan: Continue per POC. Increase reps/resistance as tolerated.      Precautions: standard      Manuals                                                               Neuro Re-Ed             TA Brace HEP 5\"x10                        SL Balance             Eccentric Dorsiflexion                                                    Ther Ex             SKTC HEP 10\"x10           Seated HR HEP 2x10           Seated TR HEP 2x10           Pball Roll Out 3 Way  Fwd 5\"x10           Ankle TB 4 Way  Red 10x ea                                                  Ther Activity             Bike  5'                        Gait Training                                       Modalities                                              "

## 2025-01-30 NOTE — PROGRESS NOTES
"Daily Note     Today's date: 2025  Patient name: Vika Lauren  : 1960  MRN: [id number]  Referring provider: Livia Quinones P*  Dx:   Encounter Diagnosis     ICD-10-CM    1. Lumbar back pain with radiculopathy affecting left lower extremity  M54.16                      Subjective: Pt reports some Lt LE symptoms that seemed to dissipate following the bike. Overall improvement.      Objective: See treatment diary below      Assessment: Pt tolerating exercises very well. Pt noted no radicular symptoms following bike and continued with none throughout flexion and extension based exercises. Educated to trial flexion based exercises when having radicular symptoms at home. Continue toe raises.    .  Plan: Continue per plan of care.      Precautions: standard      Manuals                                                               Neuro Re-Ed             TA Brace HEP 5\"x10                        SL Balance   15-20''          Eccentric Dorsiflexion                                                    Ther Ex             SKTC HEP 10\"x10           DKTC   10x5''          Seated HR HEP 2x10           Crossed LTR   10x5'' Rt          Seated TR HEP 2x10 2x10          Pball Roll Out 3 Way  Fwd 5\"x10           Ankle TB 4 Way  Red 10x ea DF red 20x          Clamshell   10x5''          Prone Hip Ext   10xea          Alt superman   10xea          Ther Activity             Bike  5' 5'                       Gait Training                                       Modalities                                              "

## 2025-02-04 ENCOUNTER — APPOINTMENT (OUTPATIENT)
Dept: PHYSICAL THERAPY | Facility: CLINIC | Age: 65
End: 2025-02-04
Payer: MEDICARE

## 2025-02-06 ENCOUNTER — OFFICE VISIT (OUTPATIENT)
Dept: PHYSICAL THERAPY | Facility: CLINIC | Age: 65
End: 2025-02-06
Payer: MEDICARE

## 2025-02-06 DIAGNOSIS — M54.16 LUMBAR BACK PAIN WITH RADICULOPATHY AFFECTING LEFT LOWER EXTREMITY: Primary | ICD-10-CM

## 2025-02-06 PROCEDURE — 97110 THERAPEUTIC EXERCISES: CPT | Performed by: PHYSICAL THERAPIST

## 2025-02-06 NOTE — PROGRESS NOTES
"Daily Note     Today's date: 2025  Patient name: Vika Lauren  : 1960  MRN: [id number]  Referring provider: Livia Quinones P*  Dx:   Encounter Diagnosis     ICD-10-CM    1. Lumbar back pain with radiculopathy affecting left lower extremity  M54.16                      Subjective: Pt reports improvement, she was able to sleep without waking due to burning pain. Also feels her dorsiflexion is improved.      Objective: See treatment diary below      Assessment: Pt is showing good improvement. LTR and standing marches due increase burning pain in hip but extension biased exercises dissipate burning exercises quickly. Educated to continue extension in standing for HEP when feeling burning pain. Will continue to progress as able.    Plan: Continue per plan of care.      Precautions: standard      Manuals  2                                                             Neuro Re-Ed             TA Brace HEP 5\"x10           WENDI    2x30''         SL Balance   15-20'' On foam 5x30''         Eccentric Dorsiflexion                                                    Ther Ex             SKTC HEP 10\"x10           DKTC   10x5'' 10x5''         Seated HR HEP 2x10           Crossed LTR   10x5'' Rt 10x 5'' Rt         Seated TR HEP 2x10 2x10 2x10         Pball Roll Out 3 Way  Fwd 5\"x10           Ankle TB 4 Way  Red 10x ea DF red 20x DF red 20x         Clamshell   10x5'' 15x5''         Prone Hip Ext   10xea 2x10 ea         Marching w/ band    20x ea         Alt superman   10xea 10xea         Ther Activity             Bike  5' 5' 8' L2                      Gait Training                                       Modalities                                                "

## 2025-02-11 ENCOUNTER — OFFICE VISIT (OUTPATIENT)
Dept: PHYSICAL THERAPY | Facility: CLINIC | Age: 65
End: 2025-02-11
Payer: MEDICARE

## 2025-02-11 DIAGNOSIS — M54.16 LUMBAR BACK PAIN WITH RADICULOPATHY AFFECTING LEFT LOWER EXTREMITY: Primary | ICD-10-CM

## 2025-02-11 PROCEDURE — 97110 THERAPEUTIC EXERCISES: CPT

## 2025-02-11 PROCEDURE — 97112 NEUROMUSCULAR REEDUCATION: CPT

## 2025-02-11 NOTE — PROGRESS NOTES
"Daily Note     Today's date: 2025  Patient name: Vika Lauren  : 1960  MRN: [id number]  Referring provider: Livia Quinones P*  Dx:   Encounter Diagnosis     ICD-10-CM    1. Lumbar back pain with radiculopathy affecting left lower extremity  M54.16                      Subjective:  Patient states she is doing alright. She reports that she believes she is improving with the help of PT.       Objective: See treatment diary below      Assessment: Tolerated treatment well. Continued with prescribed POC. She is most challenged with DF with band. Patient demonstrated fatigue post treatment, exhibited good technique with therapeutic exercises, and would benefit from continued PT      Plan: Continue per plan of care.      Precautions: standard      Manuals                                                             Neuro Re-Ed             TA Brace HEP 5\"x10           WENDI    2x30'' 2x30\"        SL Balance   15-20'' On foam 5x30'' On foam 5x 30\"        Eccentric Dorsiflexion                                                    Ther Ex             SKTC HEP 10\"x10           DKTC   10x5'' 10x5'' 10x 5\"        Seated HR HEP 2x10           Crossed LTR   10x5'' Rt 10x 5'' Rt 10x 5\" Rt        Seated TR HEP 2x10 2x10 2x10 2x10         Pball Roll Out 3 Way  Fwd 5\"x10           Ankle TB 4 Way  Red 10x ea DF red 20x DF red 20x DR red 20x         Clamshell   10x5'' 15x5'' 15x5\"         Prone Hip Ext   10xea 2x10 ea 2x10 ea         Marching w/ band    20x ea 20x ea         Alt superman   10xea 10xea 10x ea        Ther Activity             Bike  5' 5' 8' L2 8' L2                     Gait Training                                       Modalities                                                  "

## 2025-02-13 ENCOUNTER — OFFICE VISIT (OUTPATIENT)
Dept: PHYSICAL THERAPY | Facility: CLINIC | Age: 65
End: 2025-02-13
Payer: MEDICARE

## 2025-02-13 DIAGNOSIS — M54.16 LUMBAR BACK PAIN WITH RADICULOPATHY AFFECTING LEFT LOWER EXTREMITY: Primary | ICD-10-CM

## 2025-02-13 PROCEDURE — 97112 NEUROMUSCULAR REEDUCATION: CPT | Performed by: PHYSICAL THERAPY ASSISTANT

## 2025-02-13 PROCEDURE — 97110 THERAPEUTIC EXERCISES: CPT | Performed by: PHYSICAL THERAPY ASSISTANT

## 2025-02-13 NOTE — PROGRESS NOTES
"Daily Note     Today's date: 2025  Patient name: Vika Lauren  : 1960  MRN: [id number]  Referring provider: Livia Quinones P*  Dx:   Encounter Diagnosis     ICD-10-CM    1. Lumbar back pain with radiculopathy affecting left lower extremity  M54.16                      Subjective:  Patient states she is doing Ok but expresses some frustration that she is not feeling better more quickly.    Objective: See treatment diary below      Assessment: Tolerated treatment well. Pt continues to demonstrate most difficulty with TB DF, however she reports its getting easier.  Added resistance with clamshells today with good tolerance.  Patient demonstrated fatigue post treatment, exhibited good technique with therapeutic exercises, and would benefit from continued PT      Plan: Continue per plan of care.      Precautions: standard      Manuals                                                            Neuro Re-Ed             TA Brace HEP 5\"x10           WENDI    2x30'' 2x30\" 2x30\"       SL Balance   15-20'' On foam 5x30'' On foam 5x 30\" On foam 5x30\"       Eccentric Dorsiflexion                                                    Ther Ex             SKTC HEP 10\"x10           DKTC   10x5'' 10x5'' 10x 5\" 10x5\"       Seated HR HEP 2x10           Crossed LTR   10x5'' Rt 10x 5'' Rt 10x 5\" Rt 10x5\" Rt       Seated TR HEP 2x10 2x10 2x10 2x10  2x10       Pball Roll Out 3 Way  Fwd 5\"x10           Ankle TB 4 Way  Red 10x ea DF red 20x DF red 20x DR red 20x  DF Red 20x       Clamshell   10x5'' 15x5'' 15x5\"  RTB  5\"x15       Prone Hip Ext   10xea 2x10 ea 2x10 ea  2x10 ea       Marching w/ band    20x ea 20x ea  20x ea  RTB       Alt superman   10xea 10xea 10x ea 10x ea       Ther Activity             Bike  5' 5' 8' L2 8' L2 8' L2                    Gait Training                                       Modalities                                                  "

## 2025-02-18 ENCOUNTER — OFFICE VISIT (OUTPATIENT)
Dept: PHYSICAL THERAPY | Facility: CLINIC | Age: 65
End: 2025-02-18
Payer: MEDICARE

## 2025-02-18 DIAGNOSIS — M54.16 LUMBAR BACK PAIN WITH RADICULOPATHY AFFECTING LEFT LOWER EXTREMITY: Primary | ICD-10-CM

## 2025-02-18 PROCEDURE — 97110 THERAPEUTIC EXERCISES: CPT

## 2025-02-18 PROCEDURE — 97112 NEUROMUSCULAR REEDUCATION: CPT

## 2025-02-18 PROCEDURE — 97530 THERAPEUTIC ACTIVITIES: CPT

## 2025-02-18 NOTE — PROGRESS NOTES
"Daily Note     Today's date: 2025  Patient name: Vika Lauren  : 1960  MRN: [id number]  Referring provider: Livia Quinones P*  Dx:   Encounter Diagnosis     ICD-10-CM    1. Lumbar back pain with radiculopathy affecting left lower extremity  M54.16           Start Time: 0800  Stop Time: 0838  Total time in clinic (min): 38 minutes    Subjective: Patient reports a steady improvement in burning pain as well as DF strength.       Objective: See treatment diary below    Access Code: [email or web address]  URL: [email or web address]  Date: 2025  Prepared by: Essie Choi    Exercises  - Clamshell with Resistance  - 1 x daily - 7 x weekly - 2 sets - 10 reps  - Prone Hip Extension  - 1 x daily - 7 x weekly - 2 sets - 10 reps  - Superman on Table  - 1 x daily - 7 x weekly - 2 sets - 10 reps  - Prone on Elbows Stretch  - 1 x daily - 7 x weekly - 2 sets - 30 hold  - Standing Back Extension  - 1 x daily - 7 x weekly - 3 sets - 10 reps  - Single Leg Balance  - 1 x daily - 7 x weekly - 3 sets - 10 reps  - Supine Double Knee to Chest Modified  - 1 x daily - 7 x weekly - 2 sets - 10 reps - 5 hold  - Supine ITB Stretch  - 1 x daily - 7 x weekly - 2 sets - 10 reps - 5 hold  - Seated Toe Raise  - 1 x daily - 7 x weekly - 2 sets - 10 reps      Assessment: Patient is responding well to outlined POC and extension biased strengthening. Also showing improved DF ROM and endurance with repetitions. Patient requested to discharge nv as she's noticed a big improvement in symptoms. Issued updated HEP prior to departure.       Plan: Continue per POC. Increase reps/resistance as tolerated.      Precautions: standard      Manuals                                                           Neuro Re-Ed             TA Brace HEP 5\"x10           WENDI    2x30'' 2x30\" 2x30\" 2x1'      SL Balance   15-20'' On foam 5x30'' On foam 5x 30\" On foam 5x30\" On foam 5x30\"        Eccentric " "Dorsiflexion                                                    Ther Ex             SKTC HEP 10\"x10           DKTC   10x5'' 10x5'' 10x 5\" 10x5\" 15x5\"      Seated HR HEP 2x10           Crossed LTR   10x5'' Rt 10x 5'' Rt 10x 5\" Rt 10x5\" Rt 10x5\" Rt      Seated TR HEP 2x10 2x10 2x10 2x10  2x10 2x10      Pball Roll Out 3 Way  Fwd 5\"x10           Ankle TB 4 Way  Red 10x ea DF red 20x DF red 20x DR red 20x  DF Red 20x DF Red 20x      Clamshell   10x5'' 15x5'' 15x5\"  RTB  5\"x15 RTB  5\"x15      Prone Hip Ext   10xea 2x10 ea 2x10 ea  2x10 ea 2x10 ea      Marching w/ band    20x ea 20x ea  20x ea  RTB 20x ea  RTB      Alt superman   10xea 10xea 10x ea 10x ea 10x ea      Ther Activity             Bike  5' 5' 8' L2 8' L2 8' L2 8' L2                   Gait Training                                       Modalities                                                    "

## 2025-02-20 ENCOUNTER — OFFICE VISIT (OUTPATIENT)
Dept: PHYSICAL THERAPY | Facility: CLINIC | Age: 65
End: 2025-02-20
Payer: MEDICARE

## 2025-02-20 DIAGNOSIS — M54.16 LUMBAR BACK PAIN WITH RADICULOPATHY AFFECTING LEFT LOWER EXTREMITY: Primary | ICD-10-CM

## 2025-02-20 PROCEDURE — 97530 THERAPEUTIC ACTIVITIES: CPT

## 2025-02-20 PROCEDURE — 97112 NEUROMUSCULAR REEDUCATION: CPT

## 2025-02-20 PROCEDURE — 97110 THERAPEUTIC EXERCISES: CPT

## 2025-02-20 NOTE — PROGRESS NOTES
"Daily Note     Today's date: 2025  Patient name: Vika Lauren  : 1960  MRN: [id number]  Referring provider: Livia Quinones P*  Dx:   Encounter Diagnosis     ICD-10-CM    1. Lumbar back pain with radiculopathy affecting left lower extremity  M54.16           Start Time: 0757  Stop Time: 0835  Total time in clinic (min): 38 minutes    Subjective: Patient reports she's happy with progress thus far and states pain has improved significantly since IE.       Objective: See treatment diary below      Assessment: Patient tolerated treatment session well. Responds favorably to listed interventions and showing improved eccentric DF control. Patient requested to discharge upon today's visit as she's happy with results since starting physical therapy. Patient demonstrated good understanding of HEP and had issued RTB prior to departure       Plan: Patient discharged from PT services today.      Precautions: standard      Manuals                                                          Neuro Re-Ed             TA Brace HEP 5\"x10           WENDI    2x30'' 2x30\" 2x30\" 2x1' 2x1'     SL Balance   15-20'' On foam 5x30'' On foam 5x 30\" On foam 5x30\" On foam 5x30\" On foam 5x30\"       Eccentric Dorsiflexion                                                    Ther Ex             SKTC HEP 10\"x10           DKTC   10x5'' 10x5'' 10x 5\" 10x5\" 15x5\" 15x5\"     Seated HR HEP 2x10           Crossed LTR   10x5'' Rt 10x 5'' Rt 10x 5\" Rt 10x5\" Rt 10x5\" Rt 10x5\" Rt     Seated TR HEP 2x10 2x10 2x10 2x10  2x10 2x10 2x10     Pball Roll Out 3 Way  Fwd 5\"x10           Ankle TB 4 Way  Red 10x ea DF red 20x DF red 20x DR red 20x  DF Red 20x DF Red 20x DF Red 20x     Clamshell   10x5'' 15x5'' 15x5\"  RTB  5\"x15 RTB  5\"x15 RTB  5\"x15     Prone Hip Ext   10xea 2x10 ea 2x10 ea  2x10 ea 2x10 ea 2x10 ea     Marching w/ band    20x ea 20x ea  20x ea  RTB 20x ea  RTB 20x ea  RTB     Alt superman   10xea " 10xea 10x ea 10x ea 10x ea 10x ea     Ther Activity             Bike  5' 5' 8' L2 8' L2 8' L2 8' L2 8' L2                  Gait Training                                       Modalities

## 2025-03-13 ENCOUNTER — OFFICE VISIT (OUTPATIENT)
Dept: CARDIOLOGY CLINIC | Facility: CLINIC | Age: 65
End: 2025-03-13
Payer: MEDICARE

## 2025-03-13 VITALS
HEART RATE: 63 BPM | DIASTOLIC BLOOD PRESSURE: 68 MMHG | WEIGHT: 189 LBS | BODY MASS INDEX: 31.49 KG/M2 | HEIGHT: 65 IN | SYSTOLIC BLOOD PRESSURE: 140 MMHG

## 2025-03-13 DIAGNOSIS — I49.1 PREMATURE ATRIAL CONTRACTIONS: ICD-10-CM

## 2025-03-13 DIAGNOSIS — I47.19 ATRIAL TACHYCARDIA (HCC): ICD-10-CM

## 2025-03-13 DIAGNOSIS — I47.19 PAROXYSMAL ATRIAL TACHYCARDIA (HCC): Primary | ICD-10-CM

## 2025-03-13 PROCEDURE — 99214 OFFICE O/P EST MOD 30 MIN: CPT | Performed by: PHYSICIAN ASSISTANT

## 2025-03-13 PROCEDURE — 93000 ELECTROCARDIOGRAM COMPLETE: CPT | Performed by: PHYSICIAN ASSISTANT

## 2025-03-13 RX ORDER — METOPROLOL SUCCINATE 25 MG/1
25 TABLET, EXTENDED RELEASE ORAL DAILY
Qty: 90 TABLET | Refills: 3 | Status: SHIPPED | OUTPATIENT
Start: 2025-03-13

## 2025-03-13 NOTE — PROGRESS NOTES
Cardiology Office Follow Up  Vika Lauren  1960  [id number]      ASSESSMENT:  Paroxysmal atrial tachycardia    Diagnostics:  TTE 3/21/2023: EF 65%, mild MR/TR  Holter 3/21/2023: SR throughout the study, average heart rate 71 bpm, occasional PACs, several short runs of nonsustained atrial tachycardia longest consisting of 15 beats, no sustained arrhythmias were noted.    PLAN:  Restart Toprol XL 25 mg daily for symptom suppression and rhythm prophylaxis  No need for further diagnostic testing at this time   Follow-up in 6 months tentatively or as needed. She can also work through her PCP for additional refills.    Interval History/ HPI:   Vika Lauren is a 64-year-old female with past medical history of paroxysmal atrial tachycardia who follows with Dr. Craig who presents for sick visit for intermittent palpitations.  Last OV with Dr. Craig 10/20/2023. At that time she self discontinued Toprol-XL 8/2023. Reported of rare palpitations since then. Exacerbated by heat or dehydration. After drinking more fluids usually resolve.   Reports that she sustained a nephric and mechanical slip and fall to her lower back and reports of a few months of intense sciatic pain.  She report her palpitations resurfaced in the setting.  She has been taking her Toprol-XL almost every day.  Reports that as her pain got better so did her palpitations and has been taking less Toprol-XL.  She came back to discuss need for Toprol-XL moving forward.     Vitals:  140/68  63  189lbs    Past Medical History:   Diagnosis Date    BRCA1 negative     Dry eye     Endometriosis     Frozen shoulder     Pes anserine bursitis     Pityriasis rosea     Rosacea     Skin cancer 2014     Social History     Socioeconomic History    Marital status: /Civil Union     Spouse name: Not on file    Number of children: Not on file    Years of education: Not on file    Highest education level: Not on file   Occupational History    Not on  file   Tobacco Use    Smoking status: Never    Smokeless tobacco: Never   Vaping Use    Vaping status: Never Used   Substance and Sexual Activity    Alcohol use: Yes     Comment: social    Drug use: No    Sexual activity: Yes     Partners: Male     Birth control/protection: Female Sterilization   Other Topics Concern    Not on file   Social History Narrative    Always uses a seatbelt    Caffeine use    Has smoke detectors      Social Drivers of Health     Financial Resource Strain: Low Risk  (10/20/2023)    Overall Financial Resource Strain (CARDIA)     Difficulty of Paying Living Expenses: Not hard at all   Food Insecurity: No Food Insecurity (10/21/2024)    Hunger Vital Sign     Worried About Running Out of Food in the Last Year: Never true     Ran Out of Food in the Last Year: Never true   Transportation Needs: No Transportation Needs (10/21/2024)    PRAPARE - Transportation     Lack of Transportation (Medical): No     Lack of Transportation (Non-Medical): No   Physical Activity: Not on file   Stress: Not on file   Social Connections: Not on file   Intimate Partner Violence: Not At Risk (9/2/2022)    Humiliation, Afraid, Rape, and Kick questionnaire     Fear of Current or Ex-Partner: No     Emotionally Abused: No     Physically Abused: No     Sexually Abused: No   Housing Stability: Low Risk  (10/21/2024)    Housing Stability Vital Sign     Unable to Pay for Housing in the Last Year: No     Number of Times Moved in the Last Year: 0     Homeless in the Last Year: No      Family History   Problem Relation Age of Onset    Heart defect Mother     Parkinsonism Mother     Heart disease Mother         cardiac disorder    Heart defect Father     Heart disease Father         cardiac disorder    Aneurysm Sister     Cerebral aneurysm Sister     No Known Problems Sister     No Known Problems Sister     No Known Problems Daughter     No Known Problems Daughter     No Known Problems Daughter     No Known Problems Maternal  Grandmother     No Known Problems Maternal Grandfather     No Known Problems Paternal Grandmother     No Known Problems Paternal Grandfather     Arthritis Brother     Heart defect Brother     Hypertension Brother     Stroke Brother         ischemic    Kidney cancer Brother 58    Osteoporosis Brother     Heart disease Brother     No Known Problems Maternal Aunt     No Known Problems Maternal Aunt     No Known Problems Paternal Aunt      Past Surgical History:   Procedure Laterality Date    BLADDER SURGERY      endometrial madd removed from bladder during BSO     SECTION      HYSTERECTOMY      age 43    KNEE ARTHROSCOPY      medical meniscus tear and arthritis scraping     MOHS SURGERY      chemosurgery    OOPHORECTOMY Bilateral     age 43       Current Outpatient Medications:     B Complex-C (B-complex with vitamin C) tablet, Take 1 tablet by mouth daily, Disp: , Rfl:     estradiol (ESTRACE VAGINAL) 0.1 mg/g vaginal cream, Insert 1 g into the vagina 2 (two) times a week, Disp: 42.5 g, Rfl: 11    metoprolol succinate (TOPROL-XL) 25 mg 24 hr tablet, Take 1 tablet (25 mg total) by mouth daily as needed (palpitations), Disp: 30 tablet, Rfl:     Multiple Vitamin (multivitamin) tablet, Take 1 tablet by mouth daily, Disp: , Rfl:     predniSONE 10 mg tablet, Take 4 tabs on day 1,2 with food, 3 tabs on day 3,4 with food, 2 tabs on day 5,6 with food, 1 tab on day 7,8 with food, Disp: 20 tablet, Rfl: 0    Tyrvaya 0.03 MG/ACT SOLN, , Disp: , Rfl:     Review of Systems:  Review of Systems   Constitutional:  Negative for appetite change, chills, diaphoresis, fatigue and fever.   Respiratory:  Negative for cough, chest tightness and shortness of breath.    Cardiovascular:  Positive for palpitations. Negative for chest pain and leg swelling.   Gastrointestinal:  Negative for diarrhea, nausea and vomiting.   Endocrine: Negative for cold intolerance and heat intolerance.   Genitourinary:  Negative for difficulty  urinating, dysuria and enuresis.   Musculoskeletal:  Negative for arthralgias, back pain and gait problem.   Allergic/Immunologic: Negative for environmental allergies and food allergies.   Neurological:  Negative for dizziness, facial asymmetry and headaches.   Hematological:  Negative for adenopathy. Does not bruise/bleed easily.   Psychiatric/Behavioral:  Negative for agitation, behavioral problems and confusion.      Physical Exam:  Physical Exam  Constitutional:       Appearance: She is well-developed.   HENT:      Right Ear: External ear normal.      Left Ear: External ear normal.   Eyes:      Pupils: Pupils are equal, round, and reactive to light.   Cardiovascular:      Rate and Rhythm: Normal rate and regular rhythm.      Heart sounds: Normal heart sounds. No murmur heard.     No friction rub. No gallop.   Pulmonary:      Effort: Pulmonary effort is normal.      Breath sounds: Normal breath sounds.   Abdominal:      Palpations: Abdomen is soft.   Musculoskeletal:         General: Normal range of motion.      Cervical back: Normal range of motion.   Skin:     General: Skin is warm and dry.   Neurological:      Mental Status: She is alert and oriented to person, place, and time.      Deep Tendon Reflexes: Reflexes are normal and symmetric.   Psychiatric:         Behavior: Behavior normal.         Thought Content: Thought content normal.         Judgment: Judgment normal.       This note was completed in part utilizing [employer] Direct Software.  Grammatical errors, random word insertions, spelling mistakes, and incomplete sentences can be an occasional consequence of this system secondary to software limitations, ambient noise, and hardware issues.  If you have any questions or concerns about the content, text, or information contained within the body of this dictation, please contact the provider for clarification.

## 2025-03-21 ENCOUNTER — OFFICE VISIT (OUTPATIENT)
Dept: FAMILY MEDICINE CLINIC | Facility: CLINIC | Age: 65
End: 2025-03-21
Payer: MEDICARE

## 2025-03-21 VITALS
DIASTOLIC BLOOD PRESSURE: 82 MMHG | BODY MASS INDEX: 31.29 KG/M2 | HEIGHT: 65 IN | TEMPERATURE: 97.7 F | SYSTOLIC BLOOD PRESSURE: 122 MMHG | WEIGHT: 187.8 LBS | RESPIRATION RATE: 15 BRPM | HEART RATE: 64 BPM | OXYGEN SATURATION: 98 %
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
## 2025-03-21 DIAGNOSIS — S30.861A TICK BITE OF ABDOMEN, INITIAL ENCOUNTER: Primary | ICD-10-CM

## 2025-03-21 PROCEDURE — 99213 OFFICE O/P EST LOW 20 MIN: CPT | Performed by: NURSE PRACTITIONER

## 2025-03-21 PROCEDURE — G2211 COMPLEX E/M VISIT ADD ON: HCPCS | Performed by: NURSE PRACTITIONER

## 2025-03-21 RX ORDER — DOXYCYCLINE 100 MG/1
200 CAPSULE ORAL ONCE
Qty: 2 CAPSULE | Refills: 0 | Status: SHIPPED | OUTPATIENT
Start: 2025-03-21 | End: 2025-03-21

## 2025-03-21 NOTE — PROGRESS NOTES
"Name: Vika Lauren      : 1960      MRN: [id number]  Encounter Provider: MAURA Fowler  Encounter Date: 3/21/2025   Encounter department: [facility]  :  Assessment & Plan  Tick bite of abdomen, initial encounter    Orders:    doxycycline hyclate (VIBRAMYCIN) 100 mg capsule; Take 2 capsules (200 mg total) by mouth 1 (one) time for 1 dose    Prophylactic Doxcycline prescribed. Medication and s/e reviewed. Erythema around bite was marked in sharpie marker today. Pt encouraged to contact our office if erythema extends outside of the sharpie borders or if she has any lyme symptoms which we have reviewed today. Patient is encouraged to call our office for any questions/concerns, persistent or worsening symptoms. Patient states they understand and agree with treatment plan.         Pt to f/u PRN.       History of Present Illness   Pt presents today for concerns of a right lateral abdomen tick bite she noted this morning.  She was able to remove the deer tick entirely.  Pt is unsure how long the tick was attached.   She has been outside most of this week.  Denies fever, chills, joint pain, headaches or fatigue.      Review of Systems  As noted per HPI.  Objective   /82   Pulse 64   Temp 97.7 °F (36.5 °C)   Resp 15   Ht 5' 5\" (1.651 m)   Wt 85.2 kg (187 lb 12.8 oz)   LMP  (LMP Unknown)   SpO2 98%   BMI 31.25 kg/m²      Physical Exam  Vitals reviewed.   Constitutional:       Appearance: Normal appearance.   Pulmonary:      Effort: Pulmonary effort is normal.   Skin:         Neurological:      Mental Status: She is alert and oriented to person, place, and time. Mental status is at baseline.   Psychiatric:         Mood and Affect: Mood normal.         Behavior: Behavior normal.         Thought Content: Thought content normal.         Judgment: Judgment normal.         "

## 2025-06-09 ENCOUNTER — HOSPITAL ENCOUNTER (EMERGENCY)
Facility: HOSPITAL | Age: 65
Discharge: HOME | End: 2025-06-09
Attending: EMERGENCY MEDICINE | Admitting: EMERGENCY MEDICINE
Payer: MEDICARE

## 2025-06-09 ENCOUNTER — APPOINTMENT (EMERGENCY)
Dept: CT IMAGING | Facility: HOSPITAL | Age: 65
End: 2025-06-09
Payer: MEDICARE

## 2025-06-09 VITALS
SYSTOLIC BLOOD PRESSURE: 166 MMHG | TEMPERATURE: 97.8 F | BODY MASS INDEX: 30.82 KG/M2 | HEIGHT: 65 IN | DIASTOLIC BLOOD PRESSURE: 73 MMHG | RESPIRATION RATE: 18 BRPM | WEIGHT: 185 LBS | OXYGEN SATURATION: 95 % | HEART RATE: 62 BPM

## 2025-06-09 DIAGNOSIS — R42 VERTIGO: Primary | ICD-10-CM

## 2025-06-09 LAB
ALBUMIN SERPL BCG-MCNC: 4.2 G/DL (ref 3.5–5)
ALP SERPL-CCNC: 38 U/L (ref 34–104)
ALT SERPL W P-5'-P-CCNC: 23 U/L (ref 7–52)
ANION GAP SERPL CALCULATED.3IONS-SCNC: 7 MMOL/L (ref 4–13)
AST SERPL W P-5'-P-CCNC: 25 U/L (ref 13–39)
BASOPHILS # BLD AUTO: 0.04 THOUSANDS/ÂΜL (ref 0–0.1)
BASOPHILS NFR BLD AUTO: 1 % (ref 0–1)
BILIRUB SERPL-MCNC: 0.5 MG/DL (ref 0.2–1)
BNP SERPL-MCNC: 152 PG/ML (ref 0–100)
BUN SERPL-MCNC: 20 MG/DL (ref 5–25)
CALCIUM SERPL-MCNC: 9.1 MG/DL (ref 8.4–10.2)
CARDIAC TROPONIN I PNL SERPL HS: <2 NG/L (ref ?–50)
CHLORIDE SERPL-SCNC: 107 MMOL/L (ref 96–108)
CO2 SERPL-SCNC: 26 MMOL/L (ref 21–32)
CREAT SERPL-MCNC: 0.77 MG/DL (ref 0.6–1.3)
EOSINOPHIL # BLD AUTO: 0.29 THOUSAND/ÂΜL (ref 0–0.61)
EOSINOPHIL NFR BLD AUTO: 4 % (ref 0–6)
ERYTHROCYTE [DISTWIDTH] IN BLOOD BY AUTOMATED COUNT: 12.1 % (ref 11.6–15.1)
GFR SERPL CREATININE-BSD FRML MDRD: 80 ML/MIN/1.73SQ M
GLUCOSE SERPL-MCNC: 112 MG/DL (ref 65–140)
HCT VFR BLD AUTO: 40 % (ref 34.8–46.1)
HGB BLD-MCNC: 13.6 G/DL (ref 11.5–15.4)
IMM GRANULOCYTES # BLD AUTO: 0.03 THOUSAND/UL (ref 0–0.2)
IMM GRANULOCYTES NFR BLD AUTO: 0 % (ref 0–2)
LYMPHOCYTES # BLD AUTO: 2.11 THOUSANDS/ÂΜL (ref 0.6–4.47)
LYMPHOCYTES NFR BLD AUTO: 29 % (ref 14–44)
MCH RBC QN AUTO: 30.8 PG (ref 26.8–34.3)
MCHC RBC AUTO-ENTMCNC: 34 G/DL (ref 31.4–37.4)
MCV RBC AUTO: 91 FL (ref 82–98)
MONOCYTES # BLD AUTO: 0.49 THOUSAND/ÂΜL (ref 0.17–1.22)
MONOCYTES NFR BLD AUTO: 7 % (ref 4–12)
NEUTROPHILS # BLD AUTO: 4.23 THOUSANDS/ÂΜL (ref 1.85–7.62)
NEUTS SEG NFR BLD AUTO: 59 % (ref 43–75)
NRBC BLD AUTO-RTO: 0 /100 WBCS
PLATELET # BLD AUTO: 271 THOUSANDS/UL (ref 149–390)
PMV BLD AUTO: 9.6 FL (ref 8.9–12.7)
POTASSIUM SERPL-SCNC: 3.8 MMOL/L (ref 3.5–5.3)
PROT SERPL-MCNC: 6.8 G/DL (ref 6.4–8.4)
RBC # BLD AUTO: 4.41 MILLION/UL (ref 3.81–5.12)
SODIUM SERPL-SCNC: 140 MMOL/L (ref 135–147)
WBC # BLD AUTO: 7.19 THOUSAND/UL (ref 4.31–10.16)

## 2025-06-09 PROCEDURE — 96361 HYDRATE IV INFUSION ADD-ON: CPT

## 2025-06-09 PROCEDURE — 96375 TX/PRO/DX INJ NEW DRUG ADDON: CPT

## 2025-06-09 PROCEDURE — 70496 CT ANGIOGRAPHY HEAD: CPT

## 2025-06-09 PROCEDURE — 36415 COLL VENOUS BLD VENIPUNCTURE: CPT | Performed by: EMERGENCY MEDICINE

## 2025-06-09 PROCEDURE — 93005 ELECTROCARDIOGRAM TRACING: CPT

## 2025-06-09 PROCEDURE — 99285 EMERGENCY DEPT VISIT HI MDM: CPT | Performed by: EMERGENCY MEDICINE

## 2025-06-09 PROCEDURE — 99284 EMERGENCY DEPT VISIT MOD MDM: CPT

## 2025-06-09 PROCEDURE — 85025 COMPLETE CBC W/AUTO DIFF WBC: CPT | Performed by: EMERGENCY MEDICINE

## 2025-06-09 PROCEDURE — 80053 COMPREHEN METABOLIC PANEL: CPT | Performed by: EMERGENCY MEDICINE

## 2025-06-09 PROCEDURE — 84484 ASSAY OF TROPONIN QUANT: CPT | Performed by: EMERGENCY MEDICINE

## 2025-06-09 PROCEDURE — 83880 ASSAY OF NATRIURETIC PEPTIDE: CPT | Performed by: EMERGENCY MEDICINE

## 2025-06-09 PROCEDURE — 70498 CT ANGIOGRAPHY NECK: CPT

## 2025-06-09 PROCEDURE — 96374 THER/PROPH/DIAG INJ IV PUSH: CPT

## 2025-06-09 RX ORDER — ONDANSETRON 2 MG/ML
4 INJECTION INTRAMUSCULAR; INTRAVENOUS ONCE
Status: COMPLETED | OUTPATIENT
Start: 2025-06-09 | End: 2025-06-09

## 2025-06-09 RX ORDER — ONDANSETRON 4 MG/1
4 TABLET, ORALLY DISINTEGRATING ORAL EVERY 6 HOURS PRN
Qty: 20 TABLET | Refills: 0 | Status: SHIPPED | OUTPATIENT
Start: 2025-06-09

## 2025-06-09 RX ORDER — LORAZEPAM 2 MG/ML
1 INJECTION INTRAMUSCULAR ONCE
Status: COMPLETED | OUTPATIENT
Start: 2025-06-09 | End: 2025-06-09

## 2025-06-09 RX ORDER — ONDANSETRON 2 MG/ML
1 INJECTION INTRAMUSCULAR; INTRAVENOUS ONCE
Status: COMPLETED | OUTPATIENT
Start: 2025-06-09 | End: 2025-06-09

## 2025-06-09 RX ADMIN — SODIUM CHLORIDE 1000 ML: 0.9 INJECTION, SOLUTION INTRAVENOUS at 19:12

## 2025-06-09 RX ADMIN — ONDANSETRON 4 MG: 2 INJECTION INTRAMUSCULAR; INTRAVENOUS at 19:23

## 2025-06-09 RX ADMIN — LORAZEPAM 1 MG: 2 INJECTION INTRAMUSCULAR; INTRAVENOUS at 19:11

## 2025-06-09 RX ADMIN — IOHEXOL 85 ML: 350 INJECTION, SOLUTION INTRAVENOUS at 20:15

## 2025-06-09 NOTE — ED PROVIDER NOTES
Time reflects when diagnosis was documented in both MDM as applicable and the Disposition within this note       Time User Action Codes Description Comment    6/9/2025  9:46 PM Jose Salamanca Add [R42] Vertigo           ED Disposition       ED Disposition   Discharge    Condition   Stable    Date/Time   Mon Jun 9, 2025  9:44 PM    Comment   Vika Lauren discharge to home/self care.                   Assessment & Plan       Medical Decision Making  64-year-old female presenting for acute onset dizziness at 10:00 this morning.  Differential includes but is not limited to vertigo, from both central and peripheral causes, electrolyte abnormalities, arrhythmia.  At this point with no other focal neurologic deficits, much less likely to be stroke.  It has also been greater than 6 hours since symptom onset.  Will provide symptomatic management, check EKG, CTA head neck and blood work for any abnormalities.       Reviewed past medical records: Yes no recent hospitalizations or previous strokes noted     History Provided by: Patient     Consideration of tests: Imaging and labs reviewed and okay, symptoms resolved.  Likely peripheral vertigo.  Will follow-up with PCP and neurology       I have reviewed the patient's visit and any testing done in the emergency department.  They have verbalized their understanding of any testing done today and have no further questions or concerns regarding their care in the emergency room.  They will follow up with their primary care physician as well as with any specialist in their discharge instructions.  Strict return precautions were discussed.    Amount and/or Complexity of Data Reviewed  Labs: ordered.  Radiology: ordered.    Risk  Prescription drug management.        ED Course as of 06/10/25 0850   Mon Jun 09, 2025 2055 Dizziness resolved.   2142 Patient ambulated without difficulty.  Family at bedside agrees she is back at baseline feel comfortable taking patient home.        Medications   ondansetron (FOR EMS ONLY) (ZOFRAN) 4 mg/2 mL injection 4 mg (0 mg Does not apply Given to EMS 6/9/25 1908)   sodium chloride 0.9 % bolus 1,000 mL (0 mL Intravenous Stopped 6/9/25 2012)   ondansetron (ZOFRAN) injection 4 mg (4 mg Intravenous Given 6/9/25 1923)   LORazepam (ATIVAN) injection 1 mg (1 mg Intravenous Given 6/9/25 1911)   iohexol (OMNIPAQUE) 350 MG/ML injection (MULTI-DOSE) 85 mL (85 mL Intravenous Given 6/9/25 2015)       ED Risk Strat Scores                    No data recorded                            History of Present Illness       Chief Complaint   Patient presents with    Dizziness     X1 hour nausea, vomiting, dizziness. Denies abd pain/CP       Past Medical History[1]   Past Surgical History[2]   Family History[3]   Social History[4]   E-Cigarette/Vaping    E-Cigarette Use Never User       E-Cigarette/Vaping Substances      I have reviewed and agree with the history as documented.     64-year-old female presenting for concerns of dizziness.  Symptoms started as 10 AM this morning when she had a fluoride treatment in her mouth.  States she has had a history of vertigo triggers in the past with smells taste.  Previous when she can recall was related to open .  States that is worse with opening her eyes.  Happened acutely.  Associated with room spinning, nausea and vomiting.  She has no other focal weakness numbness or tingling.  No neck pain no headache no fevers no trauma.  Felt slightly better after Zofran via EMS.  She had been trying to take meclizine but continued to vomited up at home.      Dizziness  Associated symptoms: nausea and vomiting    Associated symptoms: no chest pain, no diarrhea, no palpitations, no shortness of breath and no weakness        Review of Systems   Constitutional: Negative.  Negative for chills and fever.   HENT: Negative.  Negative for rhinorrhea, sore throat, trouble swallowing and voice change.    Eyes: Negative.  Negative for pain and  visual disturbance.   Respiratory: Negative.  Negative for cough, shortness of breath and wheezing.    Cardiovascular: Negative.  Negative for chest pain and palpitations.   Gastrointestinal:  Positive for nausea and vomiting. Negative for abdominal pain and diarrhea.   Genitourinary: Negative.  Negative for dysuria and frequency.   Musculoskeletal: Negative.  Negative for neck pain and neck stiffness.   Skin: Negative.  Negative for rash.   Neurological:  Positive for dizziness. Negative for speech difficulty, weakness, light-headedness and numbness.           Objective       ED Triage Vitals [06/09/25 1851]   Temperature Pulse Blood Pressure Respirations SpO2 Patient Position - Orthostatic VS   97.8 °F (36.6 °C) (!) 54 165/79 16 100 % Lying      Temp Source Heart Rate Source BP Location FiO2 (%) Pain Score    Oral Monitor Right arm -- No Pain      Vitals      Date and Time Temp Pulse SpO2 Resp BP Pain Score FACES Pain Rating User   06/09/25 2030 -- 62 95 % 18 166/73 -- -- MG   06/09/25 2000 -- 60 100 % 22 139/73 -- -- MG   06/09/25 1930 -- 54 -- 18 144/73 -- -- MG   06/09/25 1900 -- 51 -- 15 153/80 -- -- MG   06/09/25 1851 97.8 °F (36.6 °C) 54 100 % 16 165/79 No Pain -- KM            Physical Exam  Vitals and nursing note reviewed.   Constitutional:       General: She is not in acute distress.     Appearance: She is well-developed.   HENT:      Head: Normocephalic and atraumatic.     Eyes:      Conjunctiva/sclera: Conjunctivae normal.      Pupils: Pupils are equal, round, and reactive to light.     Neck:      Trachea: No tracheal deviation.     Cardiovascular:      Rate and Rhythm: Normal rate and regular rhythm.   Pulmonary:      Effort: Pulmonary effort is normal. No respiratory distress.      Breath sounds: Normal breath sounds. No wheezing or rales.   Abdominal:      General: Bowel sounds are normal. There is no distension.      Palpations: Abdomen is soft.      Tenderness: There is no abdominal tenderness.  There is no guarding or rebound.     Musculoskeletal:         General: No tenderness or deformity. Normal range of motion.      Cervical back: Normal range of motion and neck supple.     Skin:     General: Skin is warm and dry.      Capillary Refill: Capillary refill takes less than 2 seconds.      Findings: No rash.     Neurological:      Mental Status: She is alert and oriented to person, place, and time.      GCS: GCS eye subscore is 4. GCS verbal subscore is 5. GCS motor subscore is 6.      Cranial Nerves: No facial asymmetry.      Sensory: Sensation is intact.      Motor: No weakness, seizure activity or pronator drift.      Coordination: Finger-Nose-Finger Test normal.      Comments: Patient too dizzy to do gait testing.   Psychiatric:         Behavior: Behavior normal.         Results Reviewed       Procedure Component Value Units Date/Time    B-Type Natriuretic Peptide(BNP) [[id number]]  (Abnormal) Collected: 06/09/25 1915    Lab Status: Final result Specimen: Blood from Arm, Right Updated: 06/09/25 2018      pg/mL     HS Troponin 0hr (reflex protocol) [[id number]]  (Normal) Collected: 06/09/25 1915    Lab Status: Final result Specimen: Blood from Arm, Right Updated: 06/09/25 1943     hs TnI 0hr <2 ng/L     Comprehensive metabolic panel [[id number]] Collected: 06/09/25 1915    Lab Status: Final result Specimen: Blood from Arm, Right Updated: 06/09/25 1935     Sodium 140 mmol/L      Potassium 3.8 mmol/L      Chloride 107 mmol/L      CO2 26 mmol/L      ANION GAP 7 mmol/L      BUN 20 mg/dL      Creatinine 0.77 mg/dL      Glucose 112 mg/dL      Calcium 9.1 mg/dL      AST 25 U/L      ALT 23 U/L      Alkaline Phosphatase 38 U/L      Total Protein 6.8 g/dL      Albumin 4.2 g/dL      Total Bilirubin 0.50 mg/dL      eGFR 80 ml/min/1.73sq m     Narrative:      National Kidney Disease Foundation guidelines for Chronic Kidney Disease (CKD):     Stage 1 with normal or high GFR (GFR > 90 mL/min/1.73 square meters)     Stage 2 Mild CKD (GFR = 60-89 mL/min/1.73 square meters)    Stage 3A Moderate CKD (GFR = 45-59 mL/min/1.73 square meters)    Stage 3B Moderate CKD (GFR = 30-44 mL/min/1.73 square meters)    Stage 4 Severe CKD (GFR = 15-29 mL/min/1.73 square meters)    Stage 5 End Stage CKD (GFR <15 mL/min/1.73 square meters)  Note: GFR calculation is accurate only with a steady state creatinine    CBC and differential [[id number]] Collected: 06/09/25 1915    Lab Status: Final result Specimen: Blood from Arm, Right Updated: 06/09/25 1921     WBC 7.19 Thousand/uL      RBC 4.41 Million/uL      Hemoglobin 13.6 g/dL      Hematocrit 40.0 %      MCV 91 fL      MCH 30.8 pg      MCHC 34.0 g/dL      RDW 12.1 %      MPV 9.6 fL      Platelets 271 Thousands/uL      nRBC 0 /100 WBCs      Segmented % 59 %      Immature Grans % 0 %      Lymphocytes % 29 %      Monocytes % 7 %      Eosinophils Relative 4 %      Basophils Relative 1 %      Absolute Neutrophils 4.23 Thousands/µL      Absolute Immature Grans 0.03 Thousand/uL      Absolute Lymphocytes 2.11 Thousands/µL      Absolute Monocytes 0.49 Thousand/µL      Eosinophils Absolute 0.29 Thousand/µL      Basophils Absolute 0.04 Thousands/µL             CTA head and neck with and without contrast   ED Interpretation by Jose Salamanca DO (06/09 2108)   No large mass or hemorrhage appreciated.      Final Interpretation by Edson Lugo MD (06/09 2100)         1. No evidence of acute infarct, hemorrhage or mass.   2. No hemodynamically significant stenosis, dissection or occlusion of the carotid or vertebral arteries or major vessels of the [place].                  Workstation performed: [id number]             Procedures    ED Medication and Procedure Management   Prior to Admission Medications   Prescriptions Last Dose Informant Patient Reported? Taking?   B Complex-C (B-complex with vitamin C) tablet  Self Yes No   Sig: Take 1 tablet by mouth daily   Multiple Vitamin (multivitamin)  tablet  Self Yes No   Sig: Take 1 tablet by mouth daily   Tyrvaya 0.03 MG/ACT SOLN  Self Yes No   estradiol (ESTRACE VAGINAL) 0.1 mg/g vaginal cream  Self No No   Sig: Insert 1 g into the vagina 2 (two) times a week   metoprolol succinate (TOPROL-XL) 25 mg 24 hr tablet   No No   Sig: Take 1 tablet (25 mg total) by mouth daily      Facility-Administered Medications: None     Discharge Medication List as of 2025  9:47 PM        START taking these medications    Details   ondansetron (ZOFRAN-ODT) 4 mg disintegrating tablet Take 1 tablet (4 mg total) by mouth every 6 (six) hours as needed for nausea or vomiting, Starting Mon 2025, Normal           CONTINUE these medications which have NOT CHANGED    Details   B Complex-C (B-complex with vitamin C) tablet Take 1 tablet by mouth daily, Historical Med      estradiol (ESTRACE VAGINAL) 0.1 mg/g vaginal cream Insert 1 g into the vagina 2 (two) times a week, Starting Mon 2024, Normal      metoprolol succinate (TOPROL-XL) 25 mg 24 hr tablet Take 1 tablet (25 mg total) by mouth daily, Starting Thu 3/13/2025, Normal      Multiple Vitamin (multivitamin) tablet Take 1 tablet by mouth daily, Historical Med      Tyrvaya 0.03 MG/ACT SOLN Starting Sat 3/4/2023, Historical Med           No discharge procedures on file.  ED SEPSIS DOCUMENTATION   Time reflects when diagnosis was documented in both MDM as applicable and the Disposition within this note       Time User Action Codes Description Comment    2025  9:46 PM Jose Salamanca Add [R42] Vertigo                    [1]   Past Medical History:  Diagnosis Date    Allergic     Arthritis     BRCA1 negative     Dry eye     Endometriosis     Frozen shoulder     Pes anserine bursitis     Pityriasis rosea     Rosacea     Skin cancer    [2]   Past Surgical History:  Procedure Laterality Date    BLADDER SURGERY      endometrial madd removed from bladder during BSO     SECTION      HYSTERECTOMY  2004    age 43     KNEE ARTHROSCOPY      medical meniscus tear and arthritis scraping     MOHS SURGERY      chemosurgery    OOPHORECTOMY Bilateral 2004    age 43   [3]   Family History  Problem Relation Name Age of Onset    Heart defect Mother Sandra     Parkinsonism Mother Sandra     Heart disease Mother Sandra         cardiac disorder    Hypertension Mother Sandra     Stroke Mother Sandra     Hyperlipidemia Mother Sandra     Arthritis Mother Sandra     Heart defect Father Imkey     Heart disease Father Mikey         cardiac disorder    Arthritis Father Mikey     Aneurysm Sister joselin     Cerebral aneurysm Sister joselin     No Known Problems Sister karole     No Known Problems Sister vijaya     No Known Problems Daughter joe     No Known Problems Daughter clotilde     No Known Problems Daughter isra     No Known Problems Maternal Grandmother      No Known Problems Maternal Grandfather      No Known Problems Paternal Grandmother      No Known Problems Paternal Grandfather      Arthritis Brother Zev     Heart defect Brother Zev     Hypertension Brother Zev     Stroke Brother Zev         ischemic    Kidney cancer Brother Zev 58    Osteoporosis Brother Zev     Heart disease Brother Zev          from an aneurysm    No Known Problems Maternal Aunt marty     No Known Problems Maternal Aunt guanaco     No Known Problems Paternal Aunt kayode     Cancer Brother Luis Fernando     Cancer Brother Nii     Cancer Brother Mikey    [4]   Social History  Tobacco Use    Smoking status: Never    Smokeless tobacco: Never   Vaping Use    Vaping status: Never Used   Substance Use Topics    Alcohol use: Yes     Comment: 1-2 per month    Drug use: No        Jose Salamanca DO  06/10/25 0850

## 2025-06-10 ENCOUNTER — TELEPHONE (OUTPATIENT)
Age: 65
End: 2025-06-10

## 2025-06-10 LAB
ATRIAL RATE: 56 BPM
P AXIS: 70 DEGREES
PR INTERVAL: 192 MS
QRS AXIS: 0 DEGREES
QRSD INTERVAL: 96 MS
QT INTERVAL: 446 MS
QTC INTERVAL: 430 MS
T WAVE AXIS: 53 DEGREES
VENTRICULAR RATE: 56 BPM

## 2025-06-10 NOTE — TELEPHONE ENCOUNTER
06/10/25    Patient called office today and requested to schedule a NP Appt with Neurology for VERTIGO.    Patient was seen at the ED 06/09/25 for Vertigo and was advice to see Neurology.    Patient kindly requested, if possible and Appropriate to be scheduled at [facility].    I accommodate patient to her Request.      PER DECISION TREE:  Results - Continue Scheduling   Visit: URGENT NEW PATIENT PG   Replace the original visit type.  Provider/Subgroup    Schedule with subgroup NEUROLOGY VERTIGO, BALANCE ISSUES, FORGETFULNESS,   CONFUSION, MEMORY ISSUES, DEMENTIA, ALZHEIMERS ATTENDINGS, APS      Schedule Instructions    Please change calendar date to 1 WEEK from now, and then search for openings within 1-3 weeks.   If nothing within that timeframe, please schedule to first available after that and edit waitlist to   HIGH PRIORITY.       Offer Next Available and to be place on the Waiting List.  Patient Accepted.    Per Patient OK, NP Appt scheduled for 09/26/25, 11:00 AM With   Dr. Rodriguez at the [facility].  Appt placed on the waiting list.    Provided Time, Date and Address.    Patient aware that appt details can be found in her My Chart.      Any question, please contact Patient.  Thank You.

## 2025-06-10 NOTE — ED NOTES
Patient ambulated without aid. Gait steady. Denies dizziness & -N/V     Alina Felix RN  06/09/25 0213

## 2025-06-13 ENCOUNTER — RA CDI HCC (OUTPATIENT)
Dept: OTHER | Facility: HOSPITAL | Age: 65
End: 2025-06-13

## 2025-06-19 ENCOUNTER — OFFICE VISIT (OUTPATIENT)
Dept: FAMILY MEDICINE CLINIC | Facility: CLINIC | Age: 65
End: 2025-06-19
Payer: MEDICARE

## 2025-06-19 VITALS
HEIGHT: 65 IN | TEMPERATURE: 98 F | DIASTOLIC BLOOD PRESSURE: 82 MMHG | BODY MASS INDEX: 30.99 KG/M2 | RESPIRATION RATE: 16 BRPM | WEIGHT: 186 LBS | OXYGEN SATURATION: 98 % | HEART RATE: 62 BPM | SYSTOLIC BLOOD PRESSURE: 124 MMHG

## 2025-06-19 DIAGNOSIS — R53.83 FATIGUE, UNSPECIFIED TYPE: ICD-10-CM

## 2025-06-19 DIAGNOSIS — R73.01 IFG (IMPAIRED FASTING GLUCOSE): ICD-10-CM

## 2025-06-19 DIAGNOSIS — R20.0 NUMBNESS AND TINGLING: ICD-10-CM

## 2025-06-19 DIAGNOSIS — R20.2 NUMBNESS AND TINGLING: ICD-10-CM

## 2025-06-19 DIAGNOSIS — E78.2 MIXED HYPERLIPIDEMIA: ICD-10-CM

## 2025-06-19 DIAGNOSIS — R42 VERTIGO: Primary | ICD-10-CM

## 2025-06-19 DIAGNOSIS — I47.19 PAROXYSMAL ATRIAL TACHYCARDIA (HCC): ICD-10-CM

## 2025-06-19 PROCEDURE — 99214 OFFICE O/P EST MOD 30 MIN: CPT | Performed by: PHYSICIAN ASSISTANT

## 2025-06-19 PROCEDURE — G2211 COMPLEX E/M VISIT ADD ON: HCPCS | Performed by: PHYSICIAN ASSISTANT

## 2025-06-19 NOTE — PROGRESS NOTES
Name: Vika Lauren      : 1960      MRN: [id number]  Encounter Provider: Livia Quinones PA-C  Encounter Date: 2025   Encounter department: [facility]  :  Assessment & Plan  Vertigo    Reviewed ER visit and history, labs CTA. Reassurance patients symptoms were possibly a reaction to the fluoride as she has had similar reactions in the past that involved chemicals, dizziness that resolved with zofran. Has neuro appt 2025 to be thorough.        IFG (impaired fasting glucose)    Glucose at elevated at time of ER visit will check A1C  Orders:    Hemoglobin A1C; Future    Numbness and tingling    In left foot, that patient has followed with PT for foot drop, to be thorough will check B12  Orders:    Vitamin B12; Future    Mixed hyperlipidemia  [place], check for September appt  Orders:    Lipid Panel with Direct LDL reflex; Future    Paroxysmal atrial tachycardia (HCC)    On metoprolol XL 25 mg daily now, seeing cardiology every 6 months       F/u  or sooner if needed       History of Present Illness   Patient was in her normal state on  when she went to the dentist at 10am had a fluoride treatment, mint flavored that tasted very bad to patient, so bad she made a comment to her  when she got home. Went about her daily gardening activities, had a apple with PB and came inside after a few hours feeling nauseated, ate something, laid down and felt like she was going to pass out, opened her eyes and room spinning and vomiting. Called ambulance given IV Zofran and dizzy and N/V resolved. Given Ativan, Fluids, Zofran at hospital. Labs, CTA normal. Discharged home.    Patient has had similar episode in the past due to a chemical to clean oven and had exact same reaction and treatment in ambulance.     Also cannot tolerate smells and alcohol in general.    Working with PT on left foot drop, improving.    Also notes due to increase  "of life stressors PAC became persistent and had to start Metoprolol daily. Under care Cardiology.     Wondering if this is all linked somehow.       Review of Systems    Objective   /82   Pulse 62   Temp 98 °F (36.7 °C) (Temporal)   Resp 16   Ht 5' 5\" (1.651 m)   Wt 84.4 kg (186 lb)   LMP  (LMP Unknown)   SpO2 98%   BMI 30.95 kg/m²      Physical Exam  Constitutional:       Appearance: Normal appearance. She is normal weight.   HENT:      Head: Normocephalic and atraumatic.     Neurological:      General: No focal deficit present.      Mental Status: She is alert and oriented to person, place, and time.     Psychiatric:         Mood and Affect: Mood normal.         Behavior: Behavior normal.         Thought Content: Thought content normal.         Judgment: Judgment normal.         "

## 2025-07-03 ENCOUNTER — OFFICE VISIT (OUTPATIENT)
Dept: NEUROLOGY | Facility: CLINIC | Age: 65
End: 2025-07-03
Payer: MEDICARE

## 2025-07-03 VITALS
TEMPERATURE: 98.6 F | BODY MASS INDEX: 30.82 KG/M2 | HEART RATE: 65 BPM | WEIGHT: 185 LBS | SYSTOLIC BLOOD PRESSURE: 126 MMHG | HEIGHT: 65 IN | DIASTOLIC BLOOD PRESSURE: 76 MMHG

## 2025-07-03 DIAGNOSIS — G62.9 NEUROPATHY: Primary | ICD-10-CM

## 2025-07-03 DIAGNOSIS — G60.3 IDIOPATHIC PROGRESSIVE NEUROPATHY: ICD-10-CM

## 2025-07-03 DIAGNOSIS — G60.9 HEREDITARY AND IDIOPATHIC NEUROPATHY, UNSPECIFIED: ICD-10-CM

## 2025-07-03 DIAGNOSIS — R42 VERTIGO: ICD-10-CM

## 2025-07-03 PROCEDURE — 99204 OFFICE O/P NEW MOD 45 MIN: CPT | Performed by: PSYCHIATRY & NEUROLOGY

## 2025-07-03 NOTE — ASSESSMENT & PLAN NOTE
Patient here today for hospital follow-up for vertigo.  Patient does mention some numbness and tingling in her feet bilaterally over the past year.  Patient was also evaluated by her PCPs office for intermittent left foot drop.  Patient also had accompanying significant low back pain.  Patient has not been seen by Ortho.  No x-rays of the back to date.  Patient has had improvement with physical therapy.  Patient with decreased vibratory sensation bilateral lower extremities.  Recommend EMG of the lower extremities to evaluate for both neuropathy as well as any radiculopathy.  Patient also to have neuropathy labs.  Patient reports seeing the nutritionist holistic doctor several years ago.  However, do not see any vitamin labs done looking back to 2017.  Due to possible neuropathy, will check vitamin B12, B6, B1, folate, Lyme, and Sjogren's due to history of dry eyes.    Orders:    EMG 2 limb lower extremity; Future    Vitamin B1 (Thiamine), Serum/Plasma, LC/MS/MS; Future    Vitamin B6; Future    Vitamin B12; Future    Sjogren's Antibodies; Future    Lyme Total AB W Reflex to IGM/IGG; Future    Folate; Future

## 2025-07-03 NOTE — ASSESSMENT & PLAN NOTE
Patient here today for initial neuro consultation.  Patient is a self-referral.  Patient was in the hospital at [facility] on June 9, 2025.  Patient was seen by ER.  No neuro consultation at time of evaluation.  Patient was discharged with vertigo diagnosis.  Patient had episode after fluoride treatment at the dentist.  Patient was semireclined for the procedure.  Patient has had 1 other bout about 6 years ago of vertigo that also self resolved on its own.  Patient was in the emergency room due to the nausea and vomiting and Zofran helped to resolve symptoms.  Patient had no other associated posterior fossa symptoms.  No dysarthria, hearing loss, headache, ataxia or weakness.  Patient had CT a head and neck on June 9, 2025.  No evidence of stroke hemorrhage or mass.  No hemodynamically significant stenosis, dissection or occlusion of the carotid or vertebral arteries or major vessels of the [place].  No hydrocephalus.  Patient is had no recurrence of the symptoms as well.  Exam normal except for decreased vibratory sensation bilateral lower extremities.  Patient notes occasional tremoring which is a very fine tremor occasionally when holding a booklet at [employer].  Patient's mom had Parkinson's disease and sister with benign essential tremor.  No tremor noted in today's office.  No parkinsonian features.  No nystagmus or dysmetria or ataxia or dysarthria.  Will complete neurowork-up with an MRI head with attention to the internal auditory canals with and without contrast.  Patient with recent BUN/creatinine at 80.      Orders:    MRI brain IAC wo and w contrast; Future

## 2025-07-03 NOTE — PROGRESS NOTES
Name: Vika Lauren      : 1960      MRN: [id number]  Encounter Provider: Nay Rodriguez MD  Encounter Date: 7/3/2025   [facility]  :  Assessment & Plan  Neuropathy  Patient here today for hospital follow-up for vertigo.  Patient does mention some numbness and tingling in her feet bilaterally over the past year.  Patient was also evaluated by her PCPs office for intermittent left foot drop.  Patient also had accompanying significant low back pain.  Patient has not been seen by Ortho.  No x-rays of the back to date.  Patient has had improvement with physical therapy.  Patient with decreased vibratory sensation bilateral lower extremities.  Recommend EMG of the lower extremities to evaluate for both neuropathy as well as any radiculopathy.  Patient also to have neuropathy labs.  Patient reports seeing the nutritionist holistic doctor several years ago.  However, do not see any vitamin labs done looking back to .  Due to possible neuropathy, will check vitamin B12, B6, B1, folate, Lyme, and Sjogren's due to history of dry eyes.    Orders:    EMG 2 limb lower extremity; Future    Vitamin B1 (Thiamine), Serum/Plasma, LC/MS/MS; Future    Vitamin B6; Future    Vitamin B12; Future    Sjogren's Antibodies; Future    Lyme Total AB W Reflex to IGM/IGG; Future    Folate; Future    Vertigo  Patient here today for initial neuro consultation.  Patient is a self-referral.  Patient was in the hospital at [facility] on 2025.  Patient was seen by ER.  No neuro consultation at time of evaluation.  Patient was discharged with vertigo diagnosis.  Patient had episode after fluoride treatment at the dentist.  Patient was semireclined for the procedure.  Patient has had 1 other bout about 6 years ago of vertigo that also self resolved on its own.  Patient was in the emergency room due to the nausea and vomiting and Zofran helped to resolve symptoms.  Patient  had no other associated posterior fossa symptoms.  No dysarthria, hearing loss, headache, ataxia or weakness.  Patient had CT a head and neck on June 9, 2025.  No evidence of stroke hemorrhage or mass.  No hemodynamically significant stenosis, dissection or occlusion of the carotid or vertebral arteries or major vessels of the [place].  No hydrocephalus.  Patient is had no recurrence of the symptoms as well.  Exam normal except for decreased vibratory sensation bilateral lower extremities.  Patient notes occasional tremoring which is a very fine tremor occasionally when holding a booklet at [employer].  Patient's mom had Parkinson's disease and sister with benign essential tremor.  No tremor noted in today's office.  No parkinsonian features.  No nystagmus or dysmetria or ataxia or dysarthria.  Will complete neurowork-up with an MRI head with attention to the internal auditory canals with and without contrast.  Patient with recent BUN/creatinine at 80.      Orders:    MRI brain IAC wo and w contrast; Future    Hereditary and idiopathic neuropathy, unspecified    Orders:    Vitamin B6; Future    Idiopathic progressive neuropathy    Orders:    Vitamin B12; Future    Folate; Future          History of Present Illness   HPI   Patient here today for initial neuro consultation for vertigo.  Patient is a self-referral.  Patient was in the emergency room on 6/9/20/2025.  Patient presented after having a fluoride treatment at the dentist office.  Patient was semideclined at the time.  Patient then developed nausea vomiting that was intractable.  Patient could not keep down Dramamine.  Patient was taken via ambulance to the emergency room.  Patient was discharged within about 5 hours with resolution of symptoms.  Patient had no other associated symptoms.  No loss of vision, no diplopia, no headache.  No ataxia.  No difficulties with swallowing or speech.  No weakness.  Patient had CTA head and neck done while in the  hospital for the ER visit.  No evidence of stroke or hemorrhage or mass.  No hemodynamically significant stenosis, dissection or occlusion of the carotid or vertebral arteries or major vessels of the [place].  No hydrocephalus.  Study was reviewed in detail with patient appointment today.  Patient also had multiple additional concerns due to family history of Parkinson disease in her mother as well as benign essential tremor in her sister.  Patient reports occasional physiological tremoring very slight tremoring while holding a booklet.  No dropping of objects out of the hands.  No significant resting or action component.  No tremor noted on exam.  No bradykinesia, no cogwheeling rigidity.  Normal gait.  Reviewed with patient no evidence of Parkinson's disease at at time of evaluation.  Patient recommended to have a MRI of the head attention IACs due to the episode of vertigo.  Again these are discrete events  by 6 years.  Also recommend seeing ENT.  Patient's exam also notable for decreased vibratory sensation bilateral lower extremities.  Patient does complain of intermittent paresthesias of the feet bilaterally.  Patient is also had left foot drop.  Patient has not been seen by Ortho.  Patient notes significant low back pain couple months ago.  Patient was sent for physical therapy and has had some improvement in both pain as well as left foot drop.  Patient strongly encouraged to follow-up with PCP for Ortho evaluation.  No x-ray of the back or imaging to review at time of visit.  Recommend EMG of the lower extremities to evaluate for neuropathy which will also be helpful to evaluate for lumbar radiculopathy.  Patient also needs neuropathy labs i.e. nutritional labs.  Patient reports she has been seen by a nutritionist several years ago.  However I looked back to 2017 and no nutritional labs in epic at this time.  Patient strongly recommended to have full nutritional panel which may help for  "further evaluation of the neuropathy.  Patient to follow-up after studies are performed.  Past medical history past surgical history family history social history meds allergies and review of system reviewed personally with patient at appointment today.  Total time spent today 45 minutes. Greater than 50% of total time was spent with the patient and / or family counseling and / or coordination of care   Review of Systems   Constitutional:  Negative for appetite change, fatigue and fever.   HENT: Negative.  Negative for hearing loss, tinnitus, trouble swallowing and voice change.    Eyes: Negative.  Negative for photophobia, pain and visual disturbance.   Respiratory: Negative.  Negative for shortness of breath.    Cardiovascular: Negative.  Negative for palpitations.   Gastrointestinal:  Positive for vomiting. Negative for nausea.   Endocrine: Negative.  Negative for cold intolerance.   Genitourinary: Negative.  Negative for dysuria, frequency and urgency.   Musculoskeletal:  Negative for back pain, gait problem, myalgias, neck pain and neck stiffness.   Skin: Negative.  Negative for rash.   Allergic/Immunologic: Negative.    Neurological:  Positive for dizziness. Negative for tremors, seizures, syncope, facial asymmetry, speech difficulty, weakness, light-headedness, numbness and headaches.        Dx'd w/Vertigo 6/9 after flouride tx began vomitting , could not open eyes due to spinning in room. Was given Ondansetron in the ambulance. Had testing done, was sent home w/DX of Vertigo and told to see Neurology    Hematological: Negative.  Does not bruise/bleed easily.   Psychiatric/Behavioral: Negative.  Negative for confusion, hallucinations and sleep disturbance.    All other systems reviewed and are negative.   I have personally reviewed the MA's review of systems and made changes as necessary.         Objective   /76   Pulse 65   Temp 98.6 °F (37 °C)   Ht 5' 5\" (1.651 m)   Wt 83.9 kg (185 lb)   LMP  " (LMP Unknown)   BMI 30.79 kg/m²     Physical Exam  Constitutional:       General: She is not in acute distress.     Appearance: She is not ill-appearing.     Eyes:      General: Lids are normal.      Extraocular Movements: Extraocular movements intact.      Pupils: Pupils are equal, round, and reactive to light.       Musculoskeletal:      Right lower leg: No edema.      Left lower leg: No edema.     Neurological:      Mental Status: She is alert.      Motor: Motor strength is normal.    Psychiatric:         Speech: Speech normal.       Neurological Exam  Mental Status  Alert. Recent and remote memory are intact. Speech is normal. Language is fluent with no aphasia. Attention and concentration are normal. Fund of knowledge is appropriate for level of education.    Cranial Nerves  CN II: Visual acuity is normal. Visual fields full to confrontation.  CN III, IV, VI: Extraocular movements intact bilaterally. Normal lids and orbits bilaterally. Pupils equal round and reactive to light bilaterally.  CN V: Facial sensation is normal.  CN VII: Full and symmetric facial movement.  CN VIII: Hearing is normal.  CN IX, X: Palate elevates symmetrically. Normal gag reflex.  CN XI: Shoulder shrug strength is normal.  CN XII: Tongue midline without atrophy or fasciculations.    Motor  Normal muscle bulk throughout. Normal muscle tone. No abnormal involuntary movements. Strength is 5/5 throughout all four extremities.    Sensory  Dec vib jessica lower ext  Normal temp jessica lowers.    Reflexes  Right Plantar: downgoing  Left Plantar: downgoing    Coordination  Right: Finger-to-nose normal. Rapid alternating movement normal.Left: Finger-to-nose normal. Rapid alternating movement normal.    Gait  Casual gait is normal including stance, stride, and arm swing.

## 2025-07-07 ENCOUNTER — APPOINTMENT (OUTPATIENT)
Dept: LAB | Facility: CLINIC | Age: 65
End: 2025-07-07
Payer: MEDICARE

## 2025-07-07 DIAGNOSIS — R20.2 NUMBNESS AND TINGLING: ICD-10-CM

## 2025-07-07 DIAGNOSIS — G62.9 NEUROPATHY: ICD-10-CM

## 2025-07-07 DIAGNOSIS — G60.3 IDIOPATHIC PROGRESSIVE NEUROPATHY: ICD-10-CM

## 2025-07-07 DIAGNOSIS — R73.01 IFG (IMPAIRED FASTING GLUCOSE): ICD-10-CM

## 2025-07-07 DIAGNOSIS — R53.83 FATIGUE, UNSPECIFIED TYPE: ICD-10-CM

## 2025-07-07 DIAGNOSIS — E78.2 MIXED HYPERLIPIDEMIA: ICD-10-CM

## 2025-07-07 DIAGNOSIS — R20.0 NUMBNESS AND TINGLING: ICD-10-CM

## 2025-07-07 DIAGNOSIS — G60.9 HEREDITARY AND IDIOPATHIC NEUROPATHY, UNSPECIFIED: ICD-10-CM

## 2025-07-07 LAB
ALBUMIN SERPL BCG-MCNC: 4.3 G/DL (ref 3.5–5)
ALP SERPL-CCNC: 41 U/L (ref 34–104)
ALT SERPL W P-5'-P-CCNC: 23 U/L (ref 7–52)
ANION GAP SERPL CALCULATED.3IONS-SCNC: 5 MMOL/L (ref 4–13)
AST SERPL W P-5'-P-CCNC: 30 U/L (ref 13–39)
BASOPHILS # BLD AUTO: 0.05 THOUSANDS/ÂΜL (ref 0–0.1)
BASOPHILS NFR BLD AUTO: 1 % (ref 0–1)
BILIRUB SERPL-MCNC: 0.64 MG/DL (ref 0.2–1)
BUN SERPL-MCNC: 20 MG/DL (ref 5–25)
CALCIUM SERPL-MCNC: 9 MG/DL (ref 8.4–10.2)
CHLORIDE SERPL-SCNC: 104 MMOL/L (ref 96–108)
CHOLEST SERPL-MCNC: 202 MG/DL (ref ?–200)
CO2 SERPL-SCNC: 30 MMOL/L (ref 21–32)
CREAT SERPL-MCNC: 0.77 MG/DL (ref 0.6–1.3)
EOSINOPHIL # BLD AUTO: 0.28 THOUSAND/ÂΜL (ref 0–0.61)
EOSINOPHIL NFR BLD AUTO: 5 % (ref 0–6)
ERYTHROCYTE [DISTWIDTH] IN BLOOD BY AUTOMATED COUNT: 12.7 % (ref 11.6–15.1)
EST. AVERAGE GLUCOSE BLD GHB EST-MCNC: 111 MG/DL
FOLATE SERPL-MCNC: >22.3 NG/ML
GFR SERPL CREATININE-BSD FRML MDRD: 80 ML/MIN/1.73SQ M
GLUCOSE P FAST SERPL-MCNC: 90 MG/DL (ref 65–99)
HBA1C MFR BLD: 5.5 %
HCT VFR BLD AUTO: 42.7 % (ref 34.8–46.1)
HDLC SERPL-MCNC: 63 MG/DL
HGB BLD-MCNC: 14.3 G/DL (ref 11.5–15.4)
IMM GRANULOCYTES # BLD AUTO: 0.01 THOUSAND/UL (ref 0–0.2)
IMM GRANULOCYTES NFR BLD AUTO: 0 % (ref 0–2)
LDLC SERPL CALC-MCNC: 125 MG/DL (ref 0–100)
LYMPHOCYTES # BLD AUTO: 1.8 THOUSANDS/ÂΜL (ref 0.6–4.47)
LYMPHOCYTES NFR BLD AUTO: 32 % (ref 14–44)
MCH RBC QN AUTO: 30.4 PG (ref 26.8–34.3)
MCHC RBC AUTO-ENTMCNC: 33.5 G/DL (ref 31.4–37.4)
MCV RBC AUTO: 91 FL (ref 82–98)
MONOCYTES # BLD AUTO: 0.37 THOUSAND/ÂΜL (ref 0.17–1.22)
MONOCYTES NFR BLD AUTO: 7 % (ref 4–12)
NEUTROPHILS # BLD AUTO: 3.05 THOUSANDS/ÂΜL (ref 1.85–7.62)
NEUTS SEG NFR BLD AUTO: 55 % (ref 43–75)
NRBC BLD AUTO-RTO: 0 /100 WBCS
PLATELET # BLD AUTO: 280 THOUSANDS/UL (ref 149–390)
PMV BLD AUTO: 10 FL (ref 8.9–12.7)
POTASSIUM SERPL-SCNC: 4 MMOL/L (ref 3.5–5.3)
PROT SERPL-MCNC: 6.7 G/DL (ref 6.4–8.4)
RBC # BLD AUTO: 4.7 MILLION/UL (ref 3.81–5.12)
SODIUM SERPL-SCNC: 139 MMOL/L (ref 135–147)
TRIGL SERPL-MCNC: 71 MG/DL (ref ?–150)
TSH SERPL DL<=0.05 MIU/L-ACNC: 2.13 UIU/ML (ref 0.45–4.5)
VIT B12 SERPL-MCNC: 848 PG/ML (ref 180–914)
WBC # BLD AUTO: 5.56 THOUSAND/UL (ref 4.31–10.16)

## 2025-07-07 PROCEDURE — 82746 ASSAY OF FOLIC ACID SERUM: CPT

## 2025-07-07 PROCEDURE — 80053 COMPREHEN METABOLIC PANEL: CPT

## 2025-07-07 PROCEDURE — 84443 ASSAY THYROID STIM HORMONE: CPT

## 2025-07-07 PROCEDURE — 82607 VITAMIN B-12: CPT

## 2025-07-07 PROCEDURE — 85025 COMPLETE CBC W/AUTO DIFF WBC: CPT

## 2025-07-07 PROCEDURE — 84207 ASSAY OF VITAMIN B-6: CPT

## 2025-07-07 PROCEDURE — 83036 HEMOGLOBIN GLYCOSYLATED A1C: CPT

## 2025-07-07 PROCEDURE — 84425 ASSAY OF VITAMIN B-1: CPT

## 2025-07-07 PROCEDURE — 36415 COLL VENOUS BLD VENIPUNCTURE: CPT

## 2025-07-07 PROCEDURE — 86235 NUCLEAR ANTIGEN ANTIBODY: CPT

## 2025-07-07 PROCEDURE — 86618 LYME DISEASE ANTIBODY: CPT

## 2025-07-07 PROCEDURE — 80061 LIPID PANEL: CPT

## 2025-07-08 LAB — B BURGDOR IGG+IGM SER QL IA: NEGATIVE

## 2025-07-11 ENCOUNTER — RESULTS FOLLOW-UP (OUTPATIENT)
Dept: NEUROLOGY | Facility: CLINIC | Age: 65
End: 2025-07-11

## 2025-07-11 LAB
VIT B1 SERPL-SCNC: 9 NMOL/L
VIT B6 SERPL-MCNC: 37 UG/L (ref 3.4–65.2)

## 2025-07-15 LAB
ENA SS-A AB SER IA-ACNC: <0.5 U/ML (ref ?–10)
ENA SS-B IGG SER IA-ACNC: <0.6 U/ML (ref ?–10)

## 2025-07-24 NOTE — TELEPHONE ENCOUNTER
[employer] message sent to pt with provider's response. Per chart review, PCP reviewed labs on 7/8/25 and pt reviewed notes on 7/11/25.

## 2025-07-30 ENCOUNTER — HOSPITAL ENCOUNTER (OUTPATIENT)
Dept: MRI IMAGING | Facility: HOSPITAL | Age: 65
Discharge: HOME | End: 2025-07-30
Attending: PSYCHIATRY & NEUROLOGY
Payer: MEDICARE

## 2025-07-30 DIAGNOSIS — R42 VERTIGO: ICD-10-CM

## 2025-07-30 PROCEDURE — A9585 GADOBUTROL INJECTION: HCPCS | Performed by: PSYCHIATRY & NEUROLOGY

## 2025-07-30 PROCEDURE — 70553 MRI BRAIN STEM W/O & W/DYE: CPT

## 2025-07-30 RX ORDER — GADOBUTROL 604.72 MG/ML
8 INJECTION INTRAVENOUS
Status: COMPLETED | OUTPATIENT
Start: 2025-07-30 | End: 2025-07-30

## 2025-07-30 RX ADMIN — GADOBUTROL 8 ML: 604.72 INJECTION INTRAVENOUS at 20:01

## 2025-08-05 ENCOUNTER — OFFICE VISIT (OUTPATIENT)
Dept: FAMILY MEDICINE CLINIC | Facility: CLINIC | Age: 65
End: 2025-08-05
Payer: MEDICARE

## 2025-08-05 VITALS
BODY MASS INDEX: 30.66 KG/M2 | TEMPERATURE: 98.2 F | HEIGHT: 65 IN | WEIGHT: 184 LBS | SYSTOLIC BLOOD PRESSURE: 126 MMHG | RESPIRATION RATE: 16 BRPM | OXYGEN SATURATION: 98 % | DIASTOLIC BLOOD PRESSURE: 78 MMHG | HEART RATE: 64 BPM

## 2025-08-05 DIAGNOSIS — L23.7 ALLERGIC CONTACT DERMATITIS DUE TO PLANTS, EXCEPT FOOD: Primary | ICD-10-CM

## 2025-08-05 PROCEDURE — G2211 COMPLEX E/M VISIT ADD ON: HCPCS | Performed by: PHYSICIAN ASSISTANT

## 2025-08-05 PROCEDURE — 99213 OFFICE O/P EST LOW 20 MIN: CPT | Performed by: PHYSICIAN ASSISTANT

## 2025-08-05 RX ORDER — PREDNISONE 10 MG/1
TABLET ORAL
Qty: 20 TABLET | Refills: 0 | Status: SHIPPED | OUTPATIENT
Start: 2025-08-05